# Patient Record
Sex: FEMALE | Employment: OTHER | URBAN - NONMETROPOLITAN AREA
[De-identification: names, ages, dates, MRNs, and addresses within clinical notes are randomized per-mention and may not be internally consistent; named-entity substitution may affect disease eponyms.]

---

## 2024-03-15 PROBLEM — E78.00 HYPERCHOLESTEREMIA: Status: ACTIVE | Noted: 2023-01-12

## 2024-03-15 PROBLEM — R53.1 WEAKNESS: Status: ACTIVE | Noted: 2020-11-24

## 2024-03-15 PROBLEM — I65.22 OCCLUSION OF LEFT CAROTID ARTERY: Status: ACTIVE | Noted: 2023-01-12

## 2024-03-15 PROBLEM — I44.7 LEFT BUNDLE BRANCH BLOCK: Status: ACTIVE | Noted: 2023-01-12

## 2024-03-15 RX ORDER — METOPROLOL TARTRATE 50 MG/1
50 TABLET, FILM COATED ORAL 2 TIMES DAILY
COMMUNITY
Start: 2020-09-28

## 2024-03-15 RX ORDER — MELOXICAM 15 MG/1
15 TABLET ORAL DAILY
COMMUNITY
Start: 2024-03-14

## 2024-03-15 RX ORDER — ASPIRIN 81 MG/1
81 TABLET ORAL DAILY
COMMUNITY
Start: 2020-11-25

## 2024-03-15 RX ORDER — ROSUVASTATIN CALCIUM 40 MG/1
40 TABLET, COATED ORAL DAILY
COMMUNITY
Start: 2024-01-02

## 2024-03-15 RX ORDER — AMLODIPINE BESYLATE 5 MG/1
5 TABLET ORAL 2 TIMES DAILY
COMMUNITY
Start: 2020-09-28

## 2024-04-24 ENCOUNTER — OFFICE VISIT (OUTPATIENT)
Dept: NEPHROLOGY | Age: 79
End: 2024-04-24
Payer: MEDICARE

## 2024-04-24 VITALS
DIASTOLIC BLOOD PRESSURE: 62 MMHG | BODY MASS INDEX: 23.39 KG/M2 | HEART RATE: 63 BPM | OXYGEN SATURATION: 92 % | WEIGHT: 149 LBS | SYSTOLIC BLOOD PRESSURE: 117 MMHG | HEIGHT: 67 IN

## 2024-04-24 DIAGNOSIS — I12.9 HYPERTENSIVE RENAL DISEASE, STAGE 1 THROUGH STAGE 4 OR UNSPECIFIED CHRONIC KIDNEY DISEASE: ICD-10-CM

## 2024-04-24 DIAGNOSIS — N18.32 CHRONIC KIDNEY DISEASE, STAGE 3B (HCC): Primary | ICD-10-CM

## 2024-04-24 PROCEDURE — 3074F SYST BP LT 130 MM HG: CPT | Performed by: INTERNAL MEDICINE

## 2024-04-24 PROCEDURE — G8427 DOCREV CUR MEDS BY ELIG CLIN: HCPCS | Performed by: INTERNAL MEDICINE

## 2024-04-24 PROCEDURE — G8400 PT W/DXA NO RESULTS DOC: HCPCS | Performed by: INTERNAL MEDICINE

## 2024-04-24 PROCEDURE — 1090F PRES/ABSN URINE INCON ASSESS: CPT | Performed by: INTERNAL MEDICINE

## 2024-04-24 PROCEDURE — 1036F TOBACCO NON-USER: CPT | Performed by: INTERNAL MEDICINE

## 2024-04-24 PROCEDURE — 99204 OFFICE O/P NEW MOD 45 MIN: CPT | Performed by: INTERNAL MEDICINE

## 2024-04-24 PROCEDURE — G8420 CALC BMI NORM PARAMETERS: HCPCS | Performed by: INTERNAL MEDICINE

## 2024-04-24 PROCEDURE — 1123F ACP DISCUSS/DSCN MKR DOCD: CPT | Performed by: INTERNAL MEDICINE

## 2024-04-24 PROCEDURE — 3078F DIAST BP <80 MM HG: CPT | Performed by: INTERNAL MEDICINE

## 2024-04-24 NOTE — PROGRESS NOTES
[facility] [place] [facility]  [facility]  [address]  [facility] [address]  Dept: [phone]  Loc: [phone]  Outpatient Consult Note  4/24/2024 11:14 AM        Pt Name:    Ashwini Pulido  YOB: 1945  Primary Care Physician:  Carline Bowen MD     Chief Complaint:   Chief Complaint   Patient presents with    New Patient     Referral from Anita Berrios for CKD 4, last labs 3/14/2024        Background Information/HPI   The patient is a 78 y.o. with history of CKD 3B, hypertension who is here for initial evaluation of elevated creatinine.  Past medical history also includes dyslipidemia and chronic back pain.  Has CKD. Old labs reviewed. Creat in the past been around 1.7 to 1.8. No known heart problems. No Hx PPM or AICD. Ex-smoking 1ppd x 40+ years, quit about 10 years ago. Denies any hx kidney stones. No hx leg swelling. No urinary complaints such as dysuria or hematuria. 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 Denies any NSAId intake recently. She says BP is usually in 120-130. She reports she has had HTN for several years. She says she only drinks about 16 ounces of liquids per day.       Allergies:  Patient has no known allergies.        Past Medical History:  Past Medical History:   Diagnosis Date    Chronic kidney disease     Hypertension     Kyphosis         Past Surgical History:  Past Surgical History:   Procedure Laterality Date    CHOLECYSTECTOMY N/A 11/2022        Family History:  Family History   Problem Relation Age of Onset    Heart Disease Mother     Colon Cancer Father     No Known Problems Sister     No Known Problems Sister     No Known Problems Sister     No Known Problems Sister     No Known Problems Sister     No Known Problems Brother         Social History:  Social History     Socioeconomic History    Marital status: Unknown     Spouse name: Not on file    Number of children: Not on file    Years of education: Not on file

## 2024-06-17 DIAGNOSIS — I12.9 HYPERTENSIVE RENAL DISEASE, STAGE 1 THROUGH STAGE 4 OR UNSPECIFIED CHRONIC KIDNEY DISEASE: ICD-10-CM

## 2024-06-17 DIAGNOSIS — N18.32 CHRONIC KIDNEY DISEASE, STAGE 3B (HCC): ICD-10-CM

## 2024-07-24 ENCOUNTER — OFFICE VISIT (OUTPATIENT)
Dept: NEPHROLOGY | Age: 79
End: 2024-07-24
Payer: MEDICARE

## 2024-07-24 VITALS
OXYGEN SATURATION: 91 % | WEIGHT: 141 LBS | DIASTOLIC BLOOD PRESSURE: 63 MMHG | SYSTOLIC BLOOD PRESSURE: 138 MMHG | BODY MASS INDEX: 22.13 KG/M2 | HEIGHT: 67 IN | HEART RATE: 59 BPM

## 2024-07-24 DIAGNOSIS — I12.9 HYPERTENSIVE RENAL DISEASE, STAGE 1 THROUGH STAGE 4 OR UNSPECIFIED CHRONIC KIDNEY DISEASE: ICD-10-CM

## 2024-07-24 DIAGNOSIS — R31.29 MICROSCOPIC HEMATURIA: ICD-10-CM

## 2024-07-24 DIAGNOSIS — R80.9 PROTEINURIA, UNSPECIFIED TYPE: ICD-10-CM

## 2024-07-24 DIAGNOSIS — N18.4 CKD (CHRONIC KIDNEY DISEASE), STAGE IV (HCC): Primary | ICD-10-CM

## 2024-07-24 DIAGNOSIS — N28.1 RENAL CYST: ICD-10-CM

## 2024-07-24 PROCEDURE — 1123F ACP DISCUSS/DSCN MKR DOCD: CPT | Performed by: INTERNAL MEDICINE

## 2024-07-24 PROCEDURE — 1090F PRES/ABSN URINE INCON ASSESS: CPT | Performed by: INTERNAL MEDICINE

## 2024-07-24 PROCEDURE — 99214 OFFICE O/P EST MOD 30 MIN: CPT | Performed by: INTERNAL MEDICINE

## 2024-07-24 PROCEDURE — 1036F TOBACCO NON-USER: CPT | Performed by: INTERNAL MEDICINE

## 2024-07-24 PROCEDURE — 3075F SYST BP GE 130 - 139MM HG: CPT | Performed by: INTERNAL MEDICINE

## 2024-07-24 PROCEDURE — G8420 CALC BMI NORM PARAMETERS: HCPCS | Performed by: INTERNAL MEDICINE

## 2024-07-24 PROCEDURE — 3078F DIAST BP <80 MM HG: CPT | Performed by: INTERNAL MEDICINE

## 2024-07-24 PROCEDURE — G8400 PT W/DXA NO RESULTS DOC: HCPCS | Performed by: INTERNAL MEDICINE

## 2024-07-24 PROCEDURE — G8427 DOCREV CUR MEDS BY ELIG CLIN: HCPCS | Performed by: INTERNAL MEDICINE

## 2024-07-24 RX ORDER — LOSARTAN POTASSIUM 25 MG/1
25 TABLET ORAL DAILY
Qty: 90 TABLET | Refills: 1 | Status: SHIPPED | OUTPATIENT
Start: 2024-07-24

## 2024-07-24 NOTE — PROGRESS NOTES
[facility] [place] [facility]  [facility]  [address]  [facility] [address]  Dept: [phone]  Loc: [phone]  Office Progress Note  7/24/2024 10:08 AM      Pt Name:    Ashwini Pulido  YOB: 1945  Primary Care Physician:  Carline Bowen MD     Chief Complaint:   Chief Complaint   Patient presents with    Chronic Kidney Disease     Stage 4        Background Information/Interval History:   The patient is a 78 y.o. with history of CKD 3B, hypertension who is here for follow-up evaluation of elevated creatinine.  Past medical history also includes dyslipidemia and chronic back pain.  Has CKD. Old labs reviewed. Creat in the past been around 1.7 to 1.8.  Ex-smoking 1ppd x 40+ years, quit about 10 years ago. Does not typically drink much water. Here for follow-up. No new c/o. No leg swelling. BP is stable. No UTI symptoms.  Has taken mobic in the past.      Past History:  Past Medical History:   Diagnosis Date    Chronic kidney disease     Hypertension     Kyphosis      Past Surgical History:   Procedure Laterality Date    CHOLECYSTECTOMY N/A 11/2022        VITALS:  /63 (Site: Right Upper Arm, Position: Sitting, Cuff Size: Medium Adult)   Pulse 59   Ht 1.702 m (5' 7\")   Wt 64 kg (141 lb)   SpO2 91%   BMI 22.08 kg/m²   Wt Readings from Last 3 Encounters:   07/24/24 64 kg (141 lb)   04/24/24 67.6 kg (149 lb)     Body mass index is 22.08 kg/m².     General Appearance: alert and cooperative with exam, appears comfortable, no distress  Oral: moist oral mucus membranes  Neck: No jugular venous distention  Lungs: Air entry B/L, no crackles or rales, no use of accessory muscles  Heart: S1, S2 heard  GI: soft, non-tender, no guarding  Extremities: No sig LE edema     Medications:  Current Outpatient Medications   Medication Sig Dispense Refill    amLODIPine (NORVASC) 5 MG tablet Take 1 tablet by mouth 2 times daily      aspirin 81 MG EC tablet

## 2024-08-01 LAB
BASOPHILS ABSOLUTE: 0 /ΜL
BASOPHILS RELATIVE PERCENT: 0.4 %
BUN BLDV-MCNC: 25 MG/DL
CALCIUM SERPL-MCNC: 9.8 MG/DL
CHLORIDE BLD-SCNC: 109 MMOL/L
CO2: 24 MMOL/L
CREAT SERPL-MCNC: 2.1 MG/DL
EGFR: 24
EOSINOPHILS ABSOLUTE: 0.3 /ΜL
EOSINOPHILS RELATIVE PERCENT: 5.5 %
GLUCOSE BLD-MCNC: 96 MG/DL
HCT VFR BLD CALC: 40.7 % (ref 36–46)
HEMOGLOBIN: 12.1 G/DL (ref 12–16)
LYMPHOCYTES ABSOLUTE: 1.2 /ΜL
LYMPHOCYTES RELATIVE PERCENT: 24.8 %
MCH RBC QN AUTO: 26.9 PG
MCHC RBC AUTO-ENTMCNC: 29.7 G/DL
MCV RBC AUTO: 90.4 FL
MONOCYTES ABSOLUTE: 0.4 /ΜL
MONOCYTES RELATIVE PERCENT: 7.6 %
NEUTROPHILS ABSOLUTE: 2.9 /ΜL
NEUTROPHILS RELATIVE PERCENT: 61.7 %
PLATELET # BLD: 174 K/ΜL
PMV BLD AUTO: 9.9 FL
POTASSIUM SERPL-SCNC: 5.1 MMOL/L
RBC # BLD: 4.5 10^6/ΜL
SODIUM BLD-SCNC: 141 MMOL/L
WBC # BLD: 4.8 10^3/ML

## 2024-08-21 ENCOUNTER — TELEPHONE (OUTPATIENT)
Dept: NEPHROLOGY | Age: 79
End: 2024-08-21

## 2024-08-21 DIAGNOSIS — N18.4 CKD (CHRONIC KIDNEY DISEASE), STAGE IV (HCC): Primary | ICD-10-CM

## 2024-08-21 DIAGNOSIS — I12.9 HYPERTENSIVE RENAL DISEASE, STAGE 1 THROUGH STAGE 4 OR UNSPECIFIED CHRONIC KIDNEY DISEASE: ICD-10-CM

## 2024-08-21 NOTE — TELEPHONE ENCOUNTER
Spoke to pt, she understands to get labs done. She stated that she will get labs done at [facility].    Lab orders pending.

## 2024-08-21 NOTE — TELEPHONE ENCOUNTER
----- Message from Dr. Connor Matta MD sent at 8/20/2024  6:09 PM EDT -----  Please have patient do immunofixation electrophoresis and serum free light chain assay.  ----- Message -----  From: Carine De Dios CMA ([facility])  Sent: 8/20/2024   1:42 PM EDT  To: Connor Matta MD

## 2024-08-28 ENCOUNTER — OFFICE VISIT (OUTPATIENT)
Dept: NEPHROLOGY | Age: 79
End: 2024-08-28
Payer: MEDICARE

## 2024-08-28 VITALS
WEIGHT: 140 LBS | DIASTOLIC BLOOD PRESSURE: 57 MMHG | HEART RATE: 54 BPM | BODY MASS INDEX: 21.97 KG/M2 | HEIGHT: 67 IN | SYSTOLIC BLOOD PRESSURE: 131 MMHG | OXYGEN SATURATION: 93 %

## 2024-08-28 DIAGNOSIS — I12.9 HYPERTENSIVE RENAL DISEASE, STAGE 1 THROUGH STAGE 4 OR UNSPECIFIED CHRONIC KIDNEY DISEASE: ICD-10-CM

## 2024-08-28 DIAGNOSIS — N18.4 CKD (CHRONIC KIDNEY DISEASE), STAGE IV (HCC): Primary | ICD-10-CM

## 2024-08-28 DIAGNOSIS — D47.2 MONOCLONAL GAMMOPATHY: ICD-10-CM

## 2024-08-28 PROCEDURE — G8420 CALC BMI NORM PARAMETERS: HCPCS | Performed by: INTERNAL MEDICINE

## 2024-08-28 PROCEDURE — 3078F DIAST BP <80 MM HG: CPT | Performed by: INTERNAL MEDICINE

## 2024-08-28 PROCEDURE — 99214 OFFICE O/P EST MOD 30 MIN: CPT | Performed by: INTERNAL MEDICINE

## 2024-08-28 PROCEDURE — 1036F TOBACCO NON-USER: CPT | Performed by: INTERNAL MEDICINE

## 2024-08-28 PROCEDURE — 1123F ACP DISCUSS/DSCN MKR DOCD: CPT | Performed by: INTERNAL MEDICINE

## 2024-08-28 PROCEDURE — G8427 DOCREV CUR MEDS BY ELIG CLIN: HCPCS | Performed by: INTERNAL MEDICINE

## 2024-08-28 PROCEDURE — G8400 PT W/DXA NO RESULTS DOC: HCPCS | Performed by: INTERNAL MEDICINE

## 2024-08-28 PROCEDURE — 1090F PRES/ABSN URINE INCON ASSESS: CPT | Performed by: INTERNAL MEDICINE

## 2024-08-28 PROCEDURE — 3075F SYST BP GE 130 - 139MM HG: CPT | Performed by: INTERNAL MEDICINE

## 2024-08-28 NOTE — PROGRESS NOTES
[facility] [place] [facility]  [facility] [place] [facility]  [address]  [facility] [address]  Dept: [phone]  Loc: [phone]  Office Progress Note  8/28/2024 10:28 AM      Pt Name:    Ashwini Pulido  YOB: 1945  Primary Care Physician:  Carline Bowen MD     Chief Complaint:   Chief Complaint   Patient presents with    Chronic Kidney Disease     Stage 4        Background Information/Interval History:   The patient is a 78 y.o. with history of CKD 3B, hypertension who is here for follow-up evaluation of elevated creatinine.  Past medical history also includes dyslipidemia and chronic back pain.  Has CKD. Old labs reviewed. Creat in the past been around 1.7 to 1.8.  Ex-smoking 1ppd x 40+ years, quit about 10 years ago. Has taken mobic in the past. Doing okay. Today offer no new complaints. Bp is stable.      Past History:  Past Medical History:   Diagnosis Date    Chronic kidney disease     Hypertension     Kyphosis      Past Surgical History:   Procedure Laterality Date    CHOLECYSTECTOMY N/A 11/2022        VITALS:  BP (!) 131/57 (Site: Right Upper Arm, Position: Sitting, Cuff Size: Medium Adult)   Pulse 54   Ht 1.702 m (5' 7\")   Wt 63.5 kg (140 lb)   SpO2 93%   BMI 21.93 kg/m²   Wt Readings from Last 3 Encounters:   08/28/24 63.5 kg (140 lb)   07/24/24 64 kg (141 lb)   04/24/24 67.6 kg (149 lb)     Body mass index is 21.93 kg/m².     General Appearance: alert and cooperative with exam, appears comfortable, no distress  Oral: moist oral mucus membranes  Neck: No jugular venous distention  Lungs: Air entry B/L, no crackles or rales, no use of accessory muscles  Heart: S1, S2 heard  GI: soft, non-tender, no guarding  Extremities: No sig LE edema     Medications:  Current Outpatient Medications   Medication Sig Dispense Refill    losartan (COZAAR) 25 MG tablet Take 1 tablet by mouth daily 90 tablet 1    amLODIPine (NORVASC) 5 MG tablet Take 1 tablet by

## 2024-09-03 ENCOUNTER — OFFICE VISIT (OUTPATIENT)
Dept: ONCOLOGY | Age: 79
End: 2024-09-03
Payer: MEDICARE

## 2024-09-03 ENCOUNTER — HOSPITAL ENCOUNTER (OUTPATIENT)
Dept: INFUSION THERAPY | Age: 79
Discharge: HOME | End: 2024-09-03
Payer: MEDICARE

## 2024-09-03 VITALS
OXYGEN SATURATION: 93 % | DIASTOLIC BLOOD PRESSURE: 63 MMHG | RESPIRATION RATE: 16 BRPM | TEMPERATURE: 98 F | HEART RATE: 57 BPM | SYSTOLIC BLOOD PRESSURE: 127 MMHG

## 2024-09-03 VITALS
TEMPERATURE: 98 F | RESPIRATION RATE: 16 BRPM | HEIGHT: 65 IN | OXYGEN SATURATION: 93 % | BODY MASS INDEX: 23.43 KG/M2 | DIASTOLIC BLOOD PRESSURE: 63 MMHG | HEART RATE: 57 BPM | SYSTOLIC BLOOD PRESSURE: 127 MMHG | WEIGHT: 140.6 LBS

## 2024-09-03 DIAGNOSIS — I12.9 HYPERTENSIVE RENAL DISEASE, STAGE 1 THROUGH STAGE 4 OR UNSPECIFIED CHRONIC KIDNEY DISEASE: ICD-10-CM

## 2024-09-03 DIAGNOSIS — D47.2 MONOCLONAL GAMMOPATHY: Primary | ICD-10-CM

## 2024-09-03 DIAGNOSIS — D47.2 MONOCLONAL GAMMOPATHY: ICD-10-CM

## 2024-09-03 DIAGNOSIS — N18.4 CKD (CHRONIC KIDNEY DISEASE), STAGE IV (HCC): ICD-10-CM

## 2024-09-03 LAB
ANION GAP SERPL CALC-SCNC: 15 MEQ/L (ref 8–16)
BUN SERPL-MCNC: 32 MG/DL (ref 7–22)
CALCIUM SERPL-MCNC: 9.8 MG/DL (ref 8.5–10.5)
CHLORIDE SERPL-SCNC: 107 MEQ/L (ref 98–111)
CO2 SERPL-SCNC: 20 MEQ/L (ref 23–33)
CREAT SERPL-MCNC: 1.9 MG/DL (ref 0.4–1.2)
GFR SERPL CREATININE-BSD FRML MDRD: 27 ML/MIN/1.73M2
GLUCOSE SERPL-MCNC: 102 MG/DL (ref 70–108)
POTASSIUM SERPL-SCNC: 5.1 MEQ/L (ref 3.5–5.2)
SODIUM SERPL-SCNC: 142 MEQ/L (ref 135–145)

## 2024-09-03 PROCEDURE — 80048 BASIC METABOLIC PNL TOTAL CA: CPT

## 2024-09-03 PROCEDURE — 3074F SYST BP LT 130 MM HG: CPT | Performed by: INTERNAL MEDICINE

## 2024-09-03 PROCEDURE — 3078F DIAST BP <80 MM HG: CPT | Performed by: INTERNAL MEDICINE

## 2024-09-03 PROCEDURE — 99211 OFF/OP EST MAY X REQ PHY/QHP: CPT

## 2024-09-03 PROCEDURE — 82784 ASSAY IGA/IGD/IGG/IGM EACH: CPT

## 2024-09-03 PROCEDURE — 99204 OFFICE O/P NEW MOD 45 MIN: CPT | Performed by: INTERNAL MEDICINE

## 2024-09-03 PROCEDURE — G8420 CALC BMI NORM PARAMETERS: HCPCS | Performed by: INTERNAL MEDICINE

## 2024-09-03 PROCEDURE — 1090F PRES/ABSN URINE INCON ASSESS: CPT | Performed by: INTERNAL MEDICINE

## 2024-09-03 PROCEDURE — 82232 ASSAY OF BETA-2 PROTEIN: CPT

## 2024-09-03 PROCEDURE — 1123F ACP DISCUSS/DSCN MKR DOCD: CPT | Performed by: INTERNAL MEDICINE

## 2024-09-03 PROCEDURE — G8400 PT W/DXA NO RESULTS DOC: HCPCS | Performed by: INTERNAL MEDICINE

## 2024-09-03 PROCEDURE — G8427 DOCREV CUR MEDS BY ELIG CLIN: HCPCS | Performed by: INTERNAL MEDICINE

## 2024-09-03 PROCEDURE — 1036F TOBACCO NON-USER: CPT | Performed by: INTERNAL MEDICINE

## 2024-09-03 ASSESSMENT — ENCOUNTER SYMPTOMS
BLOOD IN STOOL: 0
ANAL BLEEDING: 0
CHOKING: 0
BACK PAIN: 0
CONSTIPATION: 0
FACIAL SWELLING: 0
SINUS PAIN: 0
EYE DISCHARGE: 0
TROUBLE SWALLOWING: 0
ABDOMINAL PAIN: 0
SHORTNESS OF BREATH: 0
VOMITING: 0
APNEA: 0
ABDOMINAL DISTENTION: 0
COLOR CHANGE: 0
NAUSEA: 0
COUGH: 0
DIARRHEA: 1
RECTAL PAIN: 0
STRIDOR: 0
CHEST TIGHTNESS: 0
EYE PAIN: 0
WHEEZING: 0

## 2024-09-03 NOTE — PATIENT INSTRUCTIONS
Orders Placed This Encounter    IgA     Standing Status:   Future     Standing Expiration Date:   9/3/2025    IgG     Standing Status:   Future     Standing Expiration Date:   9/3/2025    IgM     Standing Status:   Future     Standing Expiration Date:   9/3/2025   Complete all active future lab orders today as well.    Return in about 2 weeks (around 9/17/2024).

## 2024-09-03 NOTE — PROGRESS NOTES
Hypertension    Hypercholesteremia        Surgery:  Past Surgical History:   Procedure Laterality Date    CHOLECYSTECTOMY N/A 11/2022        Medications:  Current Outpatient Medications   Medication Sig Dispense Refill    losartan (COZAAR) 25 MG tablet Take 1 tablet by mouth daily 90 tablet 1    amLODIPine (NORVASC) 5 MG tablet Take 1 tablet by mouth 2 times daily      aspirin 81 MG EC tablet Take 1 tablet by mouth daily      metoprolol tartrate (LOPRESSOR) 50 MG tablet Take 1 tablet by mouth 2 times daily      rosuvastatin (CRESTOR) 40 MG tablet Take 1 tablet by mouth daily       No current facility-administered medications for this visit.         EXAM:   height is 1.648 m (5' 4.9\") and weight is 63.8 kg (140 lb 9.6 oz). Her oral temperature is 98 °F (36.7 °C). Her blood pressure is 127/63 and her pulse is 57. Her respiration is 16 and oxygen saturation is 93%.   Body mass index is 23.47 kg/m².     Physical Exam  Physical Exam  Constitutional:       General: She is not in acute distress.     Appearance: Normal appearance. She is normal weight. She is not ill-appearing, toxic-appearing or diaphoretic.   HENT:      Head: Normocephalic.      Right Ear: External ear normal.      Left Ear: External ear normal.      Nose: Nose normal.   Eyes:      General: No scleral icterus.     Extraocular Movements: Extraocular movements intact.      Conjunctiva/sclera: Conjunctivae normal.   Neck:      Vascular: No carotid bruit.   Cardiovascular:      Rate and Rhythm: Normal rate and regular rhythm.      Heart sounds: No murmur heard.     No friction rub. No gallop.   Pulmonary:      Effort: No respiratory distress.      Breath sounds: Normal breath sounds. No stridor. No wheezing, rhonchi or rales.   Chest:      Chest wall: No tenderness.   Abdominal:      General: Abdomen is flat. Bowel sounds are normal. There is no distension.      Palpations: Abdomen is soft. There is no mass.      Tenderness: There is no abdominal tenderness.

## 2024-09-05 ENCOUNTER — HOSPITAL ENCOUNTER (OUTPATIENT)
Age: 79
Discharge: HOME | End: 2024-09-05
Payer: MEDICARE

## 2024-09-05 DIAGNOSIS — D47.2 MONOCLONAL GAMMOPATHY: ICD-10-CM

## 2024-09-05 DIAGNOSIS — N18.4 CKD (CHRONIC KIDNEY DISEASE), STAGE IV (HCC): ICD-10-CM

## 2024-09-05 DIAGNOSIS — I12.9 HYPERTENSIVE RENAL DISEASE, STAGE 1 THROUGH STAGE 4 OR UNSPECIFIED CHRONIC KIDNEY DISEASE: ICD-10-CM

## 2024-09-05 LAB
B2 MICROGLOB SERPL-MCNC: 5.2 MG/L
BACTERIA: ABNORMAL
BILIRUB UR QL STRIP: NEGATIVE
CASTS #/AREA URNS LPF: ABNORMAL /LPF
CASTS #/AREA URNS LPF: ABNORMAL /LPF
CHARACTER UR: CLEAR
CHARCOAL URNS QL MICRO: ABNORMAL
COLOR UR: YELLOW
CREAT UR-MCNC: 39.1 MG/DL
CRYSTALS URNS QL MICRO: ABNORMAL
EPITHELIAL CELLS, UA: ABNORMAL /HPF
GLUCOSE UR QL STRIP.AUTO: NEGATIVE MG/DL
HGB UR QL STRIP.AUTO: NEGATIVE
IGA SERPL-MCNC: 259 MG/DL (ref 70–400)
IGG SERPL-MCNC: 1350 MG/DL (ref 700–1600)
IGM SERPL-MCNC: 128 MG/DL (ref 40–230)
KETONES UR QL STRIP.AUTO: NEGATIVE
LEUKOCYTE ESTERASE UR QL STRIP.AUTO: ABNORMAL
NITRITE UR QL STRIP.AUTO: NEGATIVE
PH UR STRIP.AUTO: 5.5 [PH] (ref 5–9)
PROT UR STRIP.AUTO-MCNC: 30 MG/DL
PROT UR-MCNC: 14.3 MG/DL
PROT/CREAT 24H UR: 0.37 MG/G{CREAT}
RBC #/AREA URNS HPF: ABNORMAL /HPF
RENAL EPI CELLS #/AREA URNS HPF: ABNORMAL /[HPF]
SP GR UR REFRACT.AUTO: 1.01 (ref 1–1.03)
UROBILINOGEN UR QL STRIP.AUTO: 0.2 EU/DL (ref 0–1)
WBC #/AREA URNS HPF: ABNORMAL /HPF
YEAST LIKE FUNGI URNS QL MICRO: ABNORMAL

## 2024-09-05 PROCEDURE — 84156 ASSAY OF PROTEIN URINE: CPT

## 2024-09-05 PROCEDURE — 84166 PROTEIN E-PHORESIS/URINE/CSF: CPT

## 2024-09-05 PROCEDURE — 86335 IMMUNFIX E-PHORSIS/URINE/CSF: CPT

## 2024-09-05 PROCEDURE — 81001 URINALYSIS AUTO W/SCOPE: CPT

## 2024-09-05 PROCEDURE — 82570 ASSAY OF URINE CREATININE: CPT

## 2024-09-09 ENCOUNTER — TELEPHONE (OUTPATIENT)
Dept: NEPHROLOGY | Age: 79
End: 2024-09-09

## 2024-09-12 LAB — PROTEIN ELECTROPHORESIS, URINE: NORMAL

## 2024-09-17 ENCOUNTER — OFFICE VISIT (OUTPATIENT)
Dept: ONCOLOGY | Age: 79
End: 2024-09-17
Payer: MEDICARE

## 2024-09-17 ENCOUNTER — HOSPITAL ENCOUNTER (OUTPATIENT)
Dept: INFUSION THERAPY | Age: 79
Discharge: HOME | End: 2024-09-17
Payer: MEDICARE

## 2024-09-17 VITALS
TEMPERATURE: 98.9 F | SYSTOLIC BLOOD PRESSURE: 131 MMHG | RESPIRATION RATE: 16 BRPM | OXYGEN SATURATION: 91 % | BODY MASS INDEX: 23.76 KG/M2 | DIASTOLIC BLOOD PRESSURE: 63 MMHG | HEART RATE: 63 BPM | HEIGHT: 65 IN | WEIGHT: 142.6 LBS

## 2024-09-17 VITALS
HEART RATE: 63 BPM | TEMPERATURE: 98.9 F | OXYGEN SATURATION: 91 % | RESPIRATION RATE: 16 BRPM | DIASTOLIC BLOOD PRESSURE: 63 MMHG | SYSTOLIC BLOOD PRESSURE: 131 MMHG

## 2024-09-17 DIAGNOSIS — D47.2 MONOCLONAL GAMMOPATHY: Primary | ICD-10-CM

## 2024-09-17 DIAGNOSIS — D47.2 MONOCLONAL GAMMOPATHY: ICD-10-CM

## 2024-09-17 LAB
BASOPHILS # BLD AUTO: 0 THOU/MM3 (ref 0–0.1)
BASOPHILS NFR BLD AUTO: 0 % (ref 0–3)
EOSINOPHIL # BLD AUTO: 0.2 THOU/MM3 (ref 0–0.4)
EOSINOPHIL NFR BLD AUTO: 4 % (ref 0–4)
ERYTHROCYTE [DISTWIDTH] IN BLOOD BY AUTOMATED COUNT: 16.7 % (ref 11.5–14.5)
HCT VFR BLD AUTO: 37.6 % (ref 37–47)
HGB BLD-MCNC: 11.5 GM/DL (ref 12–16)
IMM GRANULOCYTES # BLD AUTO: 0.01 THOU/MM3 (ref 0–0.07)
IMM GRANULOCYTES NFR BLD AUTO: 0 %
LYMPHOCYTES # BLD AUTO: 1.5 THOU/MM3 (ref 1–4.8)
LYMPHOCYTES NFR BLD AUTO: 28 % (ref 15–47)
MCH RBC QN AUTO: 27.6 PG (ref 26–33)
MCHC RBC AUTO-ENTMCNC: 30.6 GM/DL (ref 32.2–35.5)
MCV RBC AUTO: 90 FL (ref 81–99)
MONOCYTES # BLD AUTO: 0.4 THOU/MM3 (ref 0.4–1.3)
MONOCYTES NFR BLD AUTO: 7 % (ref 0–12)
NEUTROPHILS ABSOLUTE: 3.2 THOU/MM3 (ref 1.8–7.7)
NEUTROPHILS NFR BLD AUTO: 61 % (ref 43–75)
PLATELET # BLD AUTO: 139 THOU/MM3 (ref 130–400)
PMV BLD AUTO: 9.3 FL (ref 9.4–12.4)
RBC # BLD AUTO: 4.16 MILL/MM3 (ref 4.2–5.4)
WBC # BLD AUTO: 5.3 THOU/MM3 (ref 4.8–10.8)

## 2024-09-17 PROCEDURE — 84155 ASSAY OF PROTEIN SERUM: CPT

## 2024-09-17 PROCEDURE — 84165 PROTEIN E-PHORESIS SERUM: CPT

## 2024-09-17 PROCEDURE — 1036F TOBACCO NON-USER: CPT | Performed by: STUDENT IN AN ORGANIZED HEALTH CARE EDUCATION/TRAINING PROGRAM

## 2024-09-17 PROCEDURE — 1123F ACP DISCUSS/DSCN MKR DOCD: CPT | Performed by: STUDENT IN AN ORGANIZED HEALTH CARE EDUCATION/TRAINING PROGRAM

## 2024-09-17 PROCEDURE — G8420 CALC BMI NORM PARAMETERS: HCPCS | Performed by: STUDENT IN AN ORGANIZED HEALTH CARE EDUCATION/TRAINING PROGRAM

## 2024-09-17 PROCEDURE — G8400 PT W/DXA NO RESULTS DOC: HCPCS | Performed by: STUDENT IN AN ORGANIZED HEALTH CARE EDUCATION/TRAINING PROGRAM

## 2024-09-17 PROCEDURE — 99211 OFF/OP EST MAY X REQ PHY/QHP: CPT

## 2024-09-17 PROCEDURE — 1090F PRES/ABSN URINE INCON ASSESS: CPT | Performed by: STUDENT IN AN ORGANIZED HEALTH CARE EDUCATION/TRAINING PROGRAM

## 2024-09-17 PROCEDURE — 99213 OFFICE O/P EST LOW 20 MIN: CPT | Performed by: STUDENT IN AN ORGANIZED HEALTH CARE EDUCATION/TRAINING PROGRAM

## 2024-09-17 PROCEDURE — 83521 IG LIGHT CHAINS FREE EACH: CPT

## 2024-09-17 PROCEDURE — G8427 DOCREV CUR MEDS BY ELIG CLIN: HCPCS | Performed by: STUDENT IN AN ORGANIZED HEALTH CARE EDUCATION/TRAINING PROGRAM

## 2024-09-17 PROCEDURE — 3078F DIAST BP <80 MM HG: CPT | Performed by: STUDENT IN AN ORGANIZED HEALTH CARE EDUCATION/TRAINING PROGRAM

## 2024-09-17 PROCEDURE — 3075F SYST BP GE 130 - 139MM HG: CPT | Performed by: STUDENT IN AN ORGANIZED HEALTH CARE EDUCATION/TRAINING PROGRAM

## 2024-09-17 PROCEDURE — 85025 COMPLETE CBC W/AUTO DIFF WBC: CPT

## 2024-09-17 ASSESSMENT — ENCOUNTER SYMPTOMS
BACK PAIN: 0
FACIAL SWELLING: 0
APNEA: 0
STRIDOR: 0
WHEEZING: 0
COLOR CHANGE: 0
ANAL BLEEDING: 0
ABDOMINAL DISTENTION: 0
CONSTIPATION: 0
EYE PAIN: 0
TROUBLE SWALLOWING: 0
EYE DISCHARGE: 0
DIARRHEA: 0
CHOKING: 0
CHEST TIGHTNESS: 0
SINUS PAIN: 0
BLOOD IN STOOL: 0
COUGH: 0
ABDOMINAL PAIN: 0
RECTAL PAIN: 0
VOMITING: 0
SHORTNESS OF BREATH: 0
NAUSEA: 0

## 2024-09-20 LAB — KAPPA/LAMBDA FREE LIGHT CHAINS: NORMAL

## 2024-09-22 LAB
ALBUMIN SERPL ELPH-MCNC: 3.53 G/DL (ref 3.75–5.01)
ALPHA1 GLOB SERPL ELPH-MCNC: 0.31 G/DL (ref 0.19–0.46)
ALPHA2 GLOB SERPL ELPH-MCNC: 0.81 G/DL (ref 0.48–1.05)
B-GLOBULIN SERPL ELPH-MCNC: 0.73 G/DL (ref 0.48–1.1)
GAMMA GLOB SERPL ELPH-MCNC: 1.12 G/DL (ref 0.62–1.51)
INTERPRETATION SERPL IFE-IMP: ABNORMAL
M PROTEIN SERPL ELPH-MCNC: ABNORMAL G/DL
MONOCLON BAND OBS SERPL: ABNORMAL
PATHOLOGY STUDY: ABNORMAL
PROT SERPL-MCNC: 6.5 G/DL (ref 6.3–8.2)

## 2024-11-05 ENCOUNTER — OFFICE VISIT (OUTPATIENT)
Dept: NEPHROLOGY | Age: 79
End: 2024-11-05

## 2024-11-05 VITALS
SYSTOLIC BLOOD PRESSURE: 120 MMHG | OXYGEN SATURATION: 94 % | HEIGHT: 65 IN | HEART RATE: 60 BPM | BODY MASS INDEX: 23.16 KG/M2 | DIASTOLIC BLOOD PRESSURE: 60 MMHG | WEIGHT: 139 LBS

## 2024-11-05 DIAGNOSIS — D47.2 MGUS (MONOCLONAL GAMMOPATHY OF UNKNOWN SIGNIFICANCE): ICD-10-CM

## 2024-11-05 DIAGNOSIS — N28.1 RENAL CYST: ICD-10-CM

## 2024-11-05 DIAGNOSIS — I12.9 HYPERTENSIVE RENAL DISEASE, STAGE 1 THROUGH STAGE 4 OR UNSPECIFIED CHRONIC KIDNEY DISEASE: ICD-10-CM

## 2024-11-05 DIAGNOSIS — N18.4 CKD (CHRONIC KIDNEY DISEASE), STAGE IV (HCC): Primary | ICD-10-CM

## 2024-11-05 NOTE — PROGRESS NOTES
[facility] [place] [facility]  [facility] [place] [facility]  [address]  [facility] [address]  Dept: [phone]  Loc: [phone]  Office Progress Note  11/5/2024 9:17 AM      Pt Name:    Ashwini Pulido  YOB: 1945  Primary Care Physician:  Carline Bowen MD     Chief Complaint:   Chief Complaint   Patient presents with    Chronic Kidney Disease     Stage 4        Background Information/Interval History:   The patient is a 78 y.o. with history of CKD IV, hypertension who is here for follow-up evaluation of elevated creatinine.  Past medical history also includes dyslipidemia and chronic back pain.  Has CKD. Old labs reviewed. Creat in the past been around 1.7 to 1.8.  Ex-smoking 1ppd x 40+ years, quit about 10 years ago. Has taken mobic in the past.     Pt is here for 2 months follow-up. No leg swelling. No urinary complaints. She is wearing her hearing aid but still very hard of hearing. Also appears to have poor insight. Had long discussion. She was not able to recall much from her recent visit to the hematologist. I reviewed hematologist noted and notified patient of hematologist's plan.      Past History:  Past Medical History:   Diagnosis Date    Chronic kidney disease     Hypertension     Kyphosis      Past Surgical History:   Procedure Laterality Date    CHOLECYSTECTOMY N/A 11/2022        VITALS:  /60 (Site: Right Upper Arm, Position: Sitting, Cuff Size: Medium Adult)   Pulse 60   Ht 1.651 m (5' 5\")   Wt 63 kg (139 lb)   SpO2 94%   BMI 23.13 kg/m²   Wt Readings from Last 3 Encounters:   11/05/24 63 kg (139 lb)   09/17/24 64.7 kg (142 lb 9.6 oz)   09/03/24 63.8 kg (140 lb 9.6 oz)     Body mass index is 23.13 kg/m².     General Appearance: alert and cooperative with exam, appears comfortable, no distress  Lungs: Air entry B/L, no crackles or rales, no use of accessory muscles  Heart: S1, S2 heard  GI: soft, non-tender, no guarding  Extremities:

## 2025-02-14 ENCOUNTER — OFFICE VISIT (OUTPATIENT)
Dept: NEPHROLOGY | Age: 80
End: 2025-02-14
Payer: MEDICARE

## 2025-02-14 VITALS
OXYGEN SATURATION: 95 % | HEIGHT: 66 IN | HEART RATE: 60 BPM | DIASTOLIC BLOOD PRESSURE: 62 MMHG | SYSTOLIC BLOOD PRESSURE: 127 MMHG | BODY MASS INDEX: 22.18 KG/M2 | WEIGHT: 138 LBS

## 2025-02-14 DIAGNOSIS — R82.90 ABNORMAL FINDING ON URINALYSIS: ICD-10-CM

## 2025-02-14 DIAGNOSIS — N18.4 CKD (CHRONIC KIDNEY DISEASE), STAGE IV (HCC): Primary | ICD-10-CM

## 2025-02-14 DIAGNOSIS — I12.9 HYPERTENSIVE RENAL DISEASE, STAGE 1 THROUGH STAGE 4 OR UNSPECIFIED CHRONIC KIDNEY DISEASE: ICD-10-CM

## 2025-02-14 DIAGNOSIS — R80.1 PERSISTENT PROTEINURIA, UNSPECIFIED: ICD-10-CM

## 2025-02-14 PROCEDURE — 3078F DIAST BP <80 MM HG: CPT | Performed by: INTERNAL MEDICINE

## 2025-02-14 PROCEDURE — 99214 OFFICE O/P EST MOD 30 MIN: CPT | Performed by: INTERNAL MEDICINE

## 2025-02-14 PROCEDURE — G8427 DOCREV CUR MEDS BY ELIG CLIN: HCPCS | Performed by: INTERNAL MEDICINE

## 2025-02-14 PROCEDURE — G8420 CALC BMI NORM PARAMETERS: HCPCS | Performed by: INTERNAL MEDICINE

## 2025-02-14 PROCEDURE — G8400 PT W/DXA NO RESULTS DOC: HCPCS | Performed by: INTERNAL MEDICINE

## 2025-02-14 PROCEDURE — 1123F ACP DISCUSS/DSCN MKR DOCD: CPT | Performed by: INTERNAL MEDICINE

## 2025-02-14 PROCEDURE — 1090F PRES/ABSN URINE INCON ASSESS: CPT | Performed by: INTERNAL MEDICINE

## 2025-02-14 PROCEDURE — 1159F MED LIST DOCD IN RCRD: CPT | Performed by: INTERNAL MEDICINE

## 2025-02-14 PROCEDURE — 3074F SYST BP LT 130 MM HG: CPT | Performed by: INTERNAL MEDICINE

## 2025-02-14 PROCEDURE — 1036F TOBACCO NON-USER: CPT | Performed by: INTERNAL MEDICINE

## 2025-02-14 NOTE — PROGRESS NOTES
Wants to know why her bottom number at home is low. BP at home 122/58  
2 times daily      aspirin 81 MG EC tablet Take 1 tablet by mouth daily      metoprolol tartrate (LOPRESSOR) 50 MG tablet Take 1 tablet by mouth 2 times daily      rosuvastatin (CRESTOR) 40 MG tablet Take 1 tablet by mouth daily       No current facility-administered medications for this visit.        Laboratory & Diagnostics:  Old progress notes from referring physician reviewed.  Radiology/US kidneys: 10 and 10.5 cm kidneys, B/L renal cysts  Echo:   Old labs reviewed:  November 2020 creatinine 1.8  August 2021 creatinine 1.7  October 2022 creatinine 1.6    June 2024: , K 4.7, creat 2.4, UPCR 1.4 g/g, Urine RBC 3-5 RBC, 1+ protein, 1+ blood  Aug 2024: K 5.1, creat 2.5, Hb 12.1, UA: 1+ blood, 1+ protein, 0-2 RBC, UPCR 1 g/g  Free light chain ratio 1.2, IgG lambda monoclonal band  Hep B (-), ANCA (-), Hep C (-), Complements (-), AGUSTIN (-)  Anti GBM (-), anti DS DNA (-)    Sept 2024:: K 5.1, Creat 1.9, Hb 11.5  UA: 30 protein, 0-2 RBC, UPCR 370 mg/g    Feb 2025: K 5.1, Creat 3.0, BUN 42, UA: 2+ protein, 2+ blood, +nitrite. UPCR 1.14 g/g       Impression/Plan:   1. CKD IV: has underlying risk factors of HTN and possible some element of senile nephrosclerosis. But I am concerned about additional process given she has some proteinuria, hematuria. She has IgG lambda monoclonal gammopathy.  She is seeing hematologist for MGUS. She has worsening proteinuria. Her creatinine is rising. I've strongly advised her to purse a kidney biopsy. Increase water intake. Indications, R/B/A were all discussed in detail. Discussed renal prognosis. She was reluctant to pursue kidney biopsy but after explaining her multiple times that creat is worsening, proteinuria is worsening- she has agreed to proceed. I've explained to her in detail what biopsy means, Risks, Benefits,Alternatives (R/B/A) as stated above were clearly discussed. No NSAIDs or aspirin for atleast 5 days before the biopsy.     2. HTN: on oral medications. On

## 2025-03-07 ENCOUNTER — HOSPITAL ENCOUNTER (OUTPATIENT)
Dept: CT IMAGING | Age: 80
Discharge: HOME | End: 2025-03-07
Payer: MEDICARE

## 2025-03-07 VITALS
TEMPERATURE: 98.8 F | RESPIRATION RATE: 12 BRPM | BODY MASS INDEX: 21.47 KG/M2 | HEART RATE: 72 BPM | DIASTOLIC BLOOD PRESSURE: 61 MMHG | SYSTOLIC BLOOD PRESSURE: 137 MMHG | OXYGEN SATURATION: 91 % | WEIGHT: 133 LBS

## 2025-03-07 DIAGNOSIS — R80.1 PERSISTENT PROTEINURIA, UNSPECIFIED: ICD-10-CM

## 2025-03-07 DIAGNOSIS — N18.4 CKD (CHRONIC KIDNEY DISEASE), STAGE IV (HCC): ICD-10-CM

## 2025-03-07 DIAGNOSIS — R82.90 ABNORMAL FINDING ON URINALYSIS: ICD-10-CM

## 2025-03-07 DIAGNOSIS — I12.9 HYPERTENSIVE RENAL DISEASE, STAGE 1 THROUGH STAGE 4 OR UNSPECIFIED CHRONIC KIDNEY DISEASE: ICD-10-CM

## 2025-03-07 LAB
CREAT SERPL-MCNC: 2.5 MG/DL (ref 0.5–0.9)
DEPRECATED RDW RBC AUTO: 50.1 FL (ref 35–45)
ERYTHROCYTE [DISTWIDTH] IN BLOOD BY AUTOMATED COUNT: 14.6 % (ref 11.5–14.5)
GFR SERPL CREATININE-BSD FRML MDRD: 19 ML/MIN/1.73M2
HCT VFR BLD AUTO: 38.3 % (ref 37–47)
HGB BLD-MCNC: 11.9 GM/DL (ref 12–16)
MCH RBC QN AUTO: 29.2 PG (ref 26–33)
MCHC RBC AUTO-ENTMCNC: 31.1 GM/DL (ref 32.2–35.5)
MCV RBC AUTO: 94.1 FL (ref 81–99)
PLATELET # BLD AUTO: 146 THOU/MM3 (ref 130–400)
PMV BLD AUTO: 10 FL (ref 9.4–12.4)
RBC # BLD AUTO: 4.07 MILL/MM3 (ref 4.2–5.4)
WBC # BLD AUTO: 5.4 THOU/MM3 (ref 4.8–10.8)

## 2025-03-07 PROCEDURE — 6360000002 HC RX W HCPCS: Performed by: RADIOLOGY

## 2025-03-07 PROCEDURE — 6370000000 HC RX 637 (ALT 250 FOR IP): Performed by: RADIOLOGY

## 2025-03-07 PROCEDURE — 2580000003 HC RX 258: Performed by: RADIOLOGY

## 2025-03-07 PROCEDURE — 77012 CT SCAN FOR NEEDLE BIOPSY: CPT

## 2025-03-07 RX ORDER — SODIUM CHLORIDE 450 MG/100ML
INJECTION, SOLUTION INTRAVENOUS CONTINUOUS
Status: DISCONTINUED | OUTPATIENT
Start: 2025-03-07 | End: 2025-03-08 | Stop reason: HOSPADM

## 2025-03-07 RX ORDER — MIDAZOLAM HYDROCHLORIDE 1 MG/ML
1 INJECTION, SOLUTION INTRAMUSCULAR; INTRAVENOUS ONCE
Status: DISCONTINUED | OUTPATIENT
Start: 2025-03-07 | End: 2025-03-08 | Stop reason: HOSPADM

## 2025-03-07 RX ORDER — BACITRACIN ZINC 500 [USP'U]/G
OINTMENT TOPICAL PRN
Status: COMPLETED | OUTPATIENT
Start: 2025-03-07 | End: 2025-03-07

## 2025-03-07 RX ORDER — ONDANSETRON 2 MG/ML
INJECTION INTRAMUSCULAR; INTRAVENOUS PRN
Status: COMPLETED | OUTPATIENT
Start: 2025-03-07 | End: 2025-03-07

## 2025-03-07 RX ORDER — MIDAZOLAM HYDROCHLORIDE 1 MG/ML
INJECTION, SOLUTION INTRAMUSCULAR; INTRAVENOUS PRN
Status: COMPLETED | OUTPATIENT
Start: 2025-03-07 | End: 2025-03-07

## 2025-03-07 RX ADMIN — HYDROMORPHONE HYDROCHLORIDE 0.5 MG: 1 INJECTION, SOLUTION INTRAMUSCULAR; INTRAVENOUS; SUBCUTANEOUS at 09:10

## 2025-03-07 RX ADMIN — MIDAZOLAM 0.5 MG: 1 INJECTION INTRAMUSCULAR; INTRAVENOUS at 09:10

## 2025-03-07 RX ADMIN — SODIUM CHLORIDE: 0.45 INJECTION, SOLUTION INTRAVENOUS at 07:31

## 2025-03-07 RX ADMIN — MIDAZOLAM 0.5 MG: 1 INJECTION INTRAMUSCULAR; INTRAVENOUS at 08:57

## 2025-03-07 RX ADMIN — BACITRACIN ZINC 1 UNITS: 500 OINTMENT TOPICAL at 09:36

## 2025-03-07 RX ADMIN — ONDANSETRON 4 MG: 2 INJECTION INTRAMUSCULAR; INTRAVENOUS at 10:11

## 2025-03-07 RX ADMIN — HYDROMORPHONE HYDROCHLORIDE 0.5 MG: 1 INJECTION, SOLUTION INTRAMUSCULAR; INTRAVENOUS; SUBCUTANEOUS at 08:57

## 2025-03-07 NOTE — PROGRESS NOTES
0825 Pt arrived to CT holding. Skin natural for race, warm, dry. Respirations even and unlabored.   0830 Dr. Carrizales in to evaluate pt and explain procedure.  0835 Consent obtained. Pt verbalizes agreement of site of procedure and procedure to be performed.  to imaging waiting room.   0847 Pt positioned  prone  on CT table. Monitor applied.  0850 Pre scans complete.   0900 Procedure started with Dr. Carrizales  0922 Core samples x 4 obtained from left kidney .  0924 Core samples sent to lab.   0935 Received call from lab, specimens adequate.  0936 Procedure complete. Surgifoam injected into biopsy tract per Dr. Carrizales. Bandaid to left flank puncture site.   0945 Pt repositioned supine on CT table.   1006  Post scans complete.   1009 Pt back to cart. Positioned for comfort.   1014 Report called to [facility].   1014 Pt transported to [facility] via cart. Skin natural for race, warm, dry. Respirations even and unlabored. Denies c/o pain at this time, c/o slight nausea.  with pt.

## 2025-03-07 NOTE — PROGRESS NOTES
Formulation and discussion of sedation / procedure plans, risks, benefits, side effects and alternatives with patient and/or responsible adult completed.    History and Physical reviewed and unchanged.    Electronically signed by Neeraj Carrizales MD on 3/7/25 at 9:31 AM EST

## 2025-03-07 NOTE — H&P
available.   Comment:    [x]Previous sedation/anesthesia experiences assessed.   Comment:    [x]The patient is an appropriate candidate to undergo the planned procedure sedation and anesthesia. (Refer to nursing sedation/analgesia documentation record)  [x]Formulation and discussion of sedation/procedure plan, risks, and expectations with patient and/or responsible adult completed.  [x]Patient examined immediately prior to the procedure. (Refer to nursing sedation/analgesia documentation record)    Neeraj Carrizales MD, MD  Electronically signed 3/7/2025 at 9:31 AM

## 2025-03-07 NOTE — PROGRESS NOTES
0700 Patient ambulatory to [facility] for Kidney biopsy. Patient confirms NPO, held Aspirin for 5 days.  at bedside. PT RIGHTS AND RESPONSIBILITIES OFFERED TO PT.    1020 Patient back to room post procedure. Band aid to left back dry and intact. Area soft. Small golf ball size area protruding. Applied ice pack to area. Patient denies any pain. Just some nausea.     1027 Patient resting in bed. Denies any pain. Band aid to left flank dry and intact. Area remains to same soft.     1030 Patient resting in bed. Band aid to left flank no changes.     1038 Patient still feeling nauseated. Band aid to left back no changed.     1100 Patient resting in bed. Denies any pain. Feels dizzy.     1115 Patient reports feeling little better. Band aid to left flank area dry and intact. No changes in biopsy site.     1145 Patient resting in bed. Band aid to left back dry and intact. No changes to biopsy site.     1205 Patient vomiting green liquid.     1215 Patient resting in bed. Reports feeling better after throwing.     1315 Patient resting in bed. States she feels fine. Band aid to lower back dry and intact.     1420 Patient assisted up to bathroom tolerated well. Urine clear yellow no signs of blood. Patient states she feels like she can go home. Band aid to left back dry and intact.     1425 AVS reviewed with patient and . Verbalizes understanding. Patient discharged in wheelchair to [facility].           _M___ Safety:       (Environmental)  [place] to environment  Ensure ID band is correct and in place/ allergy band as needed  Assess for fall risk  Initiate fall precautions as applicable (fall band, side rails, etc.)  Call light within reach  Bed in low position/ wheels locked    _M___ Pain:       Assess pain level and characteristics  Administer analgesics as ordered  Assess effectiveness of pain management and report to MD as needed    _M___ Knowledge Deficit:  Assess baseline knowledge  Provide teaching at level of

## 2025-03-10 ENCOUNTER — TELEPHONE (OUTPATIENT)
Dept: INTERVENTIONAL RADIOLOGY/VASCULAR | Age: 80
End: 2025-03-10

## 2025-03-12 LAB — MISC REFERENCE: NORMAL

## 2025-03-14 ENCOUNTER — OFFICE VISIT (OUTPATIENT)
Dept: NEPHROLOGY | Age: 80
End: 2025-03-14
Payer: MEDICARE

## 2025-03-14 ENCOUNTER — RESULTS FOLLOW-UP (OUTPATIENT)
Dept: NEPHROLOGY | Age: 80
End: 2025-03-14

## 2025-03-14 VITALS
OXYGEN SATURATION: 95 % | HEART RATE: 59 BPM | WEIGHT: 135 LBS | BODY MASS INDEX: 21.69 KG/M2 | SYSTOLIC BLOOD PRESSURE: 126 MMHG | HEIGHT: 66 IN | DIASTOLIC BLOOD PRESSURE: 61 MMHG

## 2025-03-14 DIAGNOSIS — N28.1 RENAL CYST: ICD-10-CM

## 2025-03-14 DIAGNOSIS — I12.9 HYPERTENSIVE RENAL DISEASE, STAGE 1 THROUGH STAGE 4 OR UNSPECIFIED CHRONIC KIDNEY DISEASE: ICD-10-CM

## 2025-03-14 DIAGNOSIS — N18.4 CKD (CHRONIC KIDNEY DISEASE), STAGE IV (HCC): Primary | ICD-10-CM

## 2025-03-14 PROCEDURE — 1036F TOBACCO NON-USER: CPT | Performed by: INTERNAL MEDICINE

## 2025-03-14 PROCEDURE — 3074F SYST BP LT 130 MM HG: CPT | Performed by: INTERNAL MEDICINE

## 2025-03-14 PROCEDURE — G8427 DOCREV CUR MEDS BY ELIG CLIN: HCPCS | Performed by: INTERNAL MEDICINE

## 2025-03-14 PROCEDURE — 3078F DIAST BP <80 MM HG: CPT | Performed by: INTERNAL MEDICINE

## 2025-03-14 PROCEDURE — 1090F PRES/ABSN URINE INCON ASSESS: CPT | Performed by: INTERNAL MEDICINE

## 2025-03-14 PROCEDURE — G8400 PT W/DXA NO RESULTS DOC: HCPCS | Performed by: INTERNAL MEDICINE

## 2025-03-14 PROCEDURE — G8420 CALC BMI NORM PARAMETERS: HCPCS | Performed by: INTERNAL MEDICINE

## 2025-03-14 PROCEDURE — 1159F MED LIST DOCD IN RCRD: CPT | Performed by: INTERNAL MEDICINE

## 2025-03-14 PROCEDURE — 99213 OFFICE O/P EST LOW 20 MIN: CPT | Performed by: INTERNAL MEDICINE

## 2025-03-14 PROCEDURE — 1123F ACP DISCUSS/DSCN MKR DOCD: CPT | Performed by: INTERNAL MEDICINE

## 2025-03-14 RX ORDER — CIPROFLOXACIN 250 MG/1
250 TABLET, FILM COATED ORAL 2 TIMES DAILY
Qty: 6 TABLET | Refills: 0 | Status: SHIPPED | OUTPATIENT
Start: 2025-03-14 | End: 2025-03-17

## 2025-03-14 NOTE — PROGRESS NOTES
Metabolic Panel    Basic Metabolic Panel    PTH, Intact    Phosphorus    Hemoglobin and Hematocrit     Return in about 4 months (around 7/14/2025).    Connor Matta MD  [facility]

## 2025-03-25 ENCOUNTER — TELEMEDICINE (OUTPATIENT)
Dept: ONCOLOGY | Age: 80
End: 2025-03-25
Payer: MEDICARE

## 2025-03-25 ENCOUNTER — HOSPITAL ENCOUNTER (OUTPATIENT)
Dept: INFUSION THERAPY | Age: 80
Discharge: HOME | End: 2025-03-25
Payer: MEDICARE

## 2025-03-25 VITALS
WEIGHT: 135.6 LBS | OXYGEN SATURATION: 96 % | SYSTOLIC BLOOD PRESSURE: 138 MMHG | HEART RATE: 61 BPM | BODY MASS INDEX: 21.79 KG/M2 | DIASTOLIC BLOOD PRESSURE: 65 MMHG | TEMPERATURE: 97.6 F | HEIGHT: 66 IN | RESPIRATION RATE: 16 BRPM

## 2025-03-25 VITALS
DIASTOLIC BLOOD PRESSURE: 65 MMHG | SYSTOLIC BLOOD PRESSURE: 138 MMHG | HEART RATE: 61 BPM | TEMPERATURE: 97.6 F | OXYGEN SATURATION: 96 % | RESPIRATION RATE: 16 BRPM

## 2025-03-25 DIAGNOSIS — D47.2 MONOCLONAL GAMMOPATHY: Primary | ICD-10-CM

## 2025-03-25 DIAGNOSIS — D47.2 MGUS (MONOCLONAL GAMMOPATHY OF UNKNOWN SIGNIFICANCE): ICD-10-CM

## 2025-03-25 DIAGNOSIS — D72.0 GENETIC ANOMALIES OF LEUKOCYTES (HCC): ICD-10-CM

## 2025-03-25 LAB
ALBUMIN SERPL BCG-MCNC: 3.7 G/DL (ref 3.4–4.9)
ALP SERPL-CCNC: 79 U/L (ref 35–104)
ALT SERPL W/O P-5'-P-CCNC: 17 U/L (ref 10–35)
AST SERPL-CCNC: 23 U/L (ref 10–35)
BASOPHILS # BLD AUTO: 0 THOU/MM3 (ref 0–0.1)
BASOPHILS NFR BLD AUTO: 0 % (ref 0–3)
BILIRUB CONJ SERPL-MCNC: < 0.1 MG/DL (ref 0–0.2)
BILIRUB SERPL-MCNC: 0.3 MG/DL (ref 0.3–1.2)
BUN BLDP-MCNC: 30 MG/DL (ref 8–26)
CHLORIDE BLD-SCNC: 111 MEQ/L (ref 98–109)
CREAT BLD-MCNC: 2.8 MG/DL (ref 0.5–1.2)
EOSINOPHIL # BLD AUTO: 0.2 THOU/MM3 (ref 0–0.4)
EOSINOPHIL NFR BLD AUTO: 4 % (ref 0–4)
ERYTHROCYTE [DISTWIDTH] IN BLOOD BY AUTOMATED COUNT: 14.2 % (ref 11.5–14.5)
GFR SERPL CREATININE-BSD FRML MDRD: 17 ML/MIN/1.73M2
GLUCOSE BLD-MCNC: 105 MG/DL (ref 70–108)
HCT VFR BLD AUTO: 35.4 % (ref 37–47)
HGB BLD-MCNC: 10.7 GM/DL (ref 12–16)
IMM GRANULOCYTES # BLD AUTO: 0 THOU/MM3 (ref 0–0.07)
IMM GRANULOCYTES NFR BLD AUTO: 0 %
IONIZED CALCIUM, WHOLE BLOOD: 1.29 MMOL/L (ref 1.12–1.32)
LYMPHOCYTES # BLD AUTO: 1.3 THOU/MM3 (ref 1–4.8)
LYMPHOCYTES NFR BLD AUTO: 26 % (ref 15–47)
MCH RBC QN AUTO: 28.7 PG (ref 26–33)
MCHC RBC AUTO-ENTMCNC: 30.2 GM/DL (ref 32.2–35.5)
MCV RBC AUTO: 95 FL (ref 81–99)
MONOCYTES # BLD AUTO: 0.4 THOU/MM3 (ref 0.4–1.3)
MONOCYTES NFR BLD AUTO: 8 % (ref 0–12)
NEUTROPHILS ABSOLUTE: 3.3 THOU/MM3 (ref 1.8–7.7)
NEUTROPHILS NFR BLD AUTO: 63 % (ref 43–75)
PLATELET # BLD AUTO: 157 THOU/MM3 (ref 130–400)
PMV BLD AUTO: 8.9 FL (ref 9.4–12.4)
POTASSIUM BLD-SCNC: 4.5 MEQ/L (ref 3.5–4.9)
PROT SERPL-MCNC: 6.9 G/DL (ref 6.4–8.3)
RBC # BLD AUTO: 3.73 MILL/MM3 (ref 4.2–5.4)
SODIUM BLD-SCNC: 143 MEQ/L (ref 138–146)
TOTAL CO2, WHOLE BLOOD: 26 MEQ/L (ref 23–33)
WBC # BLD AUTO: 5.2 THOU/MM3 (ref 4.8–10.8)

## 2025-03-25 PROCEDURE — 1036F TOBACCO NON-USER: CPT | Performed by: STUDENT IN AN ORGANIZED HEALTH CARE EDUCATION/TRAINING PROGRAM

## 2025-03-25 PROCEDURE — 80047 BASIC METABLC PNL IONIZED CA: CPT

## 2025-03-25 PROCEDURE — 1090F PRES/ABSN URINE INCON ASSESS: CPT | Performed by: STUDENT IN AN ORGANIZED HEALTH CARE EDUCATION/TRAINING PROGRAM

## 2025-03-25 PROCEDURE — 86334 IMMUNOFIX E-PHORESIS SERUM: CPT

## 2025-03-25 PROCEDURE — 82784 ASSAY IGA/IGD/IGG/IGM EACH: CPT

## 2025-03-25 PROCEDURE — 3075F SYST BP GE 130 - 139MM HG: CPT | Performed by: STUDENT IN AN ORGANIZED HEALTH CARE EDUCATION/TRAINING PROGRAM

## 2025-03-25 PROCEDURE — 1159F MED LIST DOCD IN RCRD: CPT | Performed by: STUDENT IN AN ORGANIZED HEALTH CARE EDUCATION/TRAINING PROGRAM

## 2025-03-25 PROCEDURE — 3078F DIAST BP <80 MM HG: CPT | Performed by: STUDENT IN AN ORGANIZED HEALTH CARE EDUCATION/TRAINING PROGRAM

## 2025-03-25 PROCEDURE — 1123F ACP DISCUSS/DSCN MKR DOCD: CPT | Performed by: STUDENT IN AN ORGANIZED HEALTH CARE EDUCATION/TRAINING PROGRAM

## 2025-03-25 PROCEDURE — 99211 OFF/OP EST MAY X REQ PHY/QHP: CPT

## 2025-03-25 PROCEDURE — 84165 PROTEIN E-PHORESIS SERUM: CPT

## 2025-03-25 PROCEDURE — G8400 PT W/DXA NO RESULTS DOC: HCPCS | Performed by: STUDENT IN AN ORGANIZED HEALTH CARE EDUCATION/TRAINING PROGRAM

## 2025-03-25 PROCEDURE — G8420 CALC BMI NORM PARAMETERS: HCPCS | Performed by: STUDENT IN AN ORGANIZED HEALTH CARE EDUCATION/TRAINING PROGRAM

## 2025-03-25 PROCEDURE — 83883 ASSAY NEPHELOMETRY NOT SPEC: CPT

## 2025-03-25 PROCEDURE — 82232 ASSAY OF BETA-2 PROTEIN: CPT

## 2025-03-25 PROCEDURE — G8427 DOCREV CUR MEDS BY ELIG CLIN: HCPCS | Performed by: STUDENT IN AN ORGANIZED HEALTH CARE EDUCATION/TRAINING PROGRAM

## 2025-03-25 PROCEDURE — 99214 OFFICE O/P EST MOD 30 MIN: CPT | Performed by: STUDENT IN AN ORGANIZED HEALTH CARE EDUCATION/TRAINING PROGRAM

## 2025-03-25 PROCEDURE — 84155 ASSAY OF PROTEIN SERUM: CPT

## 2025-03-25 PROCEDURE — 80076 HEPATIC FUNCTION PANEL: CPT

## 2025-03-25 PROCEDURE — 85025 COMPLETE CBC W/AUTO DIFF WBC: CPT

## 2025-03-25 ASSESSMENT — ENCOUNTER SYMPTOMS
NAUSEA: 0
FACIAL SWELLING: 0
CONSTIPATION: 0
ABDOMINAL PAIN: 0
APNEA: 0
CHOKING: 0
SHORTNESS OF BREATH: 0
STRIDOR: 0
EYE DISCHARGE: 0
SINUS PAIN: 0
COLOR CHANGE: 0
COUGH: 0
CHEST TIGHTNESS: 0
ANAL BLEEDING: 0
VOMITING: 0
EYE PAIN: 0
TROUBLE SWALLOWING: 0
ABDOMINAL DISTENTION: 0
DIARRHEA: 0
BLOOD IN STOOL: 0
RECTAL PAIN: 0
WHEEZING: 0
BACK PAIN: 0

## 2025-03-25 NOTE — PROGRESS NOTES
[facility] [place] [facility]  [facility] [place] [facility]  [address]  [address]  [facility] [address]  Dept: [phone]  Loc: [phone]   Hematology/Oncology progress note      Ashwini Pulido   1945     No ref. provider found   Carline Bowen MD       Reason:   Chief Complaint   Patient presents with    Follow-up     Monoclonal gammopathy            HPI: The is a 78-year-old woman with a approximate 3-month history of abnormal serum creatinine.  She has been seen in consultation by Dr.Vivek Weston of nephrology. Work up has been significant for a serum M protein of 0.4 g/dl.      Presents to clinic today for follow-up.  She stated that she is doing well and that she has been following up with Dr. Weston for her chronic kidney disease.  She denies any new weight weight loss, new fatigue, bone pain or recurrent infections she stated that she is doing well overall.    ROS:  Review of Systems   Constitutional:  Negative for activity change, appetite change, chills, diaphoresis, fatigue, fever and unexpected weight change.   HENT:  Negative for drooling, ear discharge, ear pain, facial swelling, hearing loss, mouth sores, nosebleeds, sinus pain and trouble swallowing.    Eyes:  Negative for pain, discharge and visual disturbance.   Respiratory:  Negative for apnea, cough, choking, chest tightness, shortness of breath, wheezing and stridor.    Cardiovascular:  Negative for chest pain, palpitations and leg swelling.   Gastrointestinal:  Negative for abdominal distention, abdominal pain, anal bleeding, blood in stool, constipation, diarrhea, nausea, rectal pain and vomiting.   Endocrine: Negative for cold intolerance, heat intolerance, polydipsia, polyphagia and polyuria.   Genitourinary:  Negative for difficulty urinating, dyspareunia, dysuria, enuresis, flank pain, frequency, genital sores, hematuria, menstrual problem and vaginal bleeding.   Musculoskeletal:  Negative for

## 2025-03-26 LAB
B2 MICROGLOB SERPL-MCNC: 6.5 MG/L
FREE KAPPA/LAMBDA RATIO: 0.94 (ref 0.22–1.74)
IGA SERPL-MCNC: 255 MG/DL (ref 70–400)
IGG SERPL-MCNC: 1330 MG/DL (ref 700–1600)
IGM SERPL-MCNC: 110 MG/DL (ref 40–230)
KAPPA LC FREE SER-MCNC: 99.4 MG/L
LAMBDA LC FREE SERPL-MCNC: 106 MG/L (ref 4.2–27.7)

## 2025-03-29 LAB — IMMUNOFIXATION WITH QUANT: NORMAL

## 2025-04-03 ENCOUNTER — HOSPITAL ENCOUNTER (OUTPATIENT)
Dept: INFUSION THERAPY | Age: 80
Discharge: HOME | End: 2025-04-03

## 2025-04-03 ENCOUNTER — SCHEDULED TELEPHONE ENCOUNTER (OUTPATIENT)
Dept: ONCOLOGY | Age: 80
End: 2025-04-03

## 2025-04-03 DIAGNOSIS — D89.0 POLYCLONAL GAMMOPATHY: Primary | ICD-10-CM

## 2025-04-03 ASSESSMENT — ENCOUNTER SYMPTOMS
ABDOMINAL DISTENTION: 0
VOMITING: 0
COUGH: 0
APNEA: 0
EYE DISCHARGE: 0
CONSTIPATION: 0
CHOKING: 0
EYE PAIN: 0
NAUSEA: 0
COLOR CHANGE: 0
SHORTNESS OF BREATH: 0
TROUBLE SWALLOWING: 0
BACK PAIN: 0
STRIDOR: 0
FACIAL SWELLING: 0
RECTAL PAIN: 0
SINUS PAIN: 0
DIARRHEA: 0
WHEEZING: 0
CHEST TIGHTNESS: 0
BLOOD IN STOOL: 0
ANAL BLEEDING: 0
ABDOMINAL PAIN: 0

## 2025-04-03 NOTE — PROGRESS NOTES
[facility] [place] [facility]  [facility] [place] [facility]  [address]  [address]  [facility] [address]  Dept: [phone]  Loc: [phone]   Hematology/Oncology progress note      Ashwini Pulido   1945     No ref. provider found   Carline Bowen MD       HPI: The is a 79-year-old woman with a approximate 3-month history of abnormal serum creatinine.  She has been seen in consultation by Dr.Vivek Weston of nephrology. Work up has been significant for a serum M protein of 0.4 g/dl.    3/25/25: IgM 110.  IgA 225.  IgG 1330.  Beta-2 microglobulin 6.5.  [place] free light chain 99.4.  Lambda free light chain 106.  MAURO shows normal pattern no monoclonal protein seen hemoglobin 10.7.  Creatinine 2.8.  Ionized calcium 1.29    3/25/2025  ROS:  Review of Systems   Constitutional:  Negative for activity change, appetite change, chills, diaphoresis, fatigue, fever and unexpected weight change.   HENT:  Negative for drooling, ear discharge, ear pain, facial swelling, hearing loss, mouth sores, nosebleeds, sinus pain and trouble swallowing.    Eyes:  Negative for pain, discharge and visual disturbance.   Respiratory:  Negative for apnea, cough, choking, chest tightness, shortness of breath, wheezing and stridor.    Cardiovascular:  Negative for chest pain, palpitations and leg swelling.   Gastrointestinal:  Negative for abdominal distention, abdominal pain, anal bleeding, blood in stool, constipation, diarrhea, nausea, rectal pain and vomiting.   Endocrine: Negative for cold intolerance, heat intolerance, polydipsia, polyphagia and polyuria.   Genitourinary:  Negative for difficulty urinating, dyspareunia, dysuria, enuresis, flank pain, frequency, genital sores, hematuria, menstrual problem and vaginal bleeding.   Musculoskeletal:  Negative for arthralgias, back pain, gait problem, joint swelling, myalgias, neck pain and neck stiffness.   Skin:  Negative for color change, pallor, rash and wound.

## 2025-04-15 ENCOUNTER — HOSPITAL ENCOUNTER (OUTPATIENT)
Dept: CT IMAGING | Age: 80
Discharge: HOME | End: 2025-04-15
Payer: MEDICARE

## 2025-04-15 VITALS
TEMPERATURE: 97.5 F | DIASTOLIC BLOOD PRESSURE: 53 MMHG | BODY MASS INDEX: 21.05 KG/M2 | RESPIRATION RATE: 18 BRPM | HEART RATE: 66 BPM | HEIGHT: 66 IN | WEIGHT: 131 LBS | SYSTOLIC BLOOD PRESSURE: 119 MMHG | OXYGEN SATURATION: 99 %

## 2025-04-15 DIAGNOSIS — D47.2 MONOCLONAL GAMMOPATHY: ICD-10-CM

## 2025-04-15 LAB
BASOPHILS ABSOLUTE: 0 THOU/MM3 (ref 0–0.1)
BASOPHILS NFR BLD AUTO: 0.6 %
CREAT SERPL-MCNC: 2.3 MG/DL (ref 0.5–0.9)
DEPRECATED RDW RBC AUTO: 46.7 FL (ref 35–45)
EOSINOPHIL NFR BLD AUTO: 2.4 %
EOSINOPHILS ABSOLUTE: 0.1 THOU/MM3 (ref 0–0.4)
ERYTHROCYTE [DISTWIDTH] IN BLOOD BY AUTOMATED COUNT: 13.8 % (ref 11.5–14.5)
GFR SERPL CREATININE-BSD FRML MDRD: 21 ML/MIN/1.73M2
HCT VFR BLD AUTO: 39.1 % (ref 37–47)
HGB BLD-MCNC: 11.7 GM/DL (ref 12–16)
IMM GRANULOCYTES # BLD AUTO: 0.01 THOU/MM3 (ref 0–0.07)
IMM GRANULOCYTES NFR BLD AUTO: 0.2 %
LYMPHOCYTES ABSOLUTE: 1.2 THOU/MM3 (ref 1–4.8)
LYMPHOCYTES NFR BLD AUTO: 22.4 %
MCH RBC QN AUTO: 28.1 PG (ref 26–33)
MCHC RBC AUTO-ENTMCNC: 29.9 GM/DL (ref 32.2–35.5)
MCV RBC AUTO: 94 FL (ref 81–99)
MONOCYTES ABSOLUTE: 0.4 THOU/MM3 (ref 0.4–1.3)
MONOCYTES NFR BLD AUTO: 7.9 %
NEUTROPHILS ABSOLUTE: 3.6 THOU/MM3 (ref 1.8–7.7)
NEUTROPHILS NFR BLD AUTO: 66.5 %
NRBC BLD AUTO-RTO: 0 /100 WBC
PLATELET # BLD AUTO: 122 THOU/MM3 (ref 130–400)
PMV BLD AUTO: 10.6 FL (ref 9.4–12.4)
RBC # BLD AUTO: 4.16 MILL/MM3 (ref 4.2–5.4)
WBC # BLD AUTO: 5.4 THOU/MM3 (ref 4.8–10.8)

## 2025-04-15 PROCEDURE — 88271 CYTOGENETICS DNA PROBE: CPT

## 2025-04-15 PROCEDURE — 88313 SPECIAL STAINS GROUP 2: CPT

## 2025-04-15 PROCEDURE — 88342 IMHCHEM/IMCYTCHM 1ST ANTB: CPT

## 2025-04-15 PROCEDURE — 88341 IMHCHEM/IMCYTCHM EA ADD ANTB: CPT

## 2025-04-15 PROCEDURE — 77012 CT SCAN FOR NEEDLE BIOPSY: CPT

## 2025-04-15 PROCEDURE — 88275 CYTOGENETICS 100-300: CPT

## 2025-04-15 PROCEDURE — 88237 TISSUE CULTURE BONE MARROW: CPT

## 2025-04-15 PROCEDURE — 36415 COLL VENOUS BLD VENIPUNCTURE: CPT

## 2025-04-15 PROCEDURE — 88368 INSITU HYBRIDIZATION MANUAL: CPT

## 2025-04-15 PROCEDURE — 88184 FLOWCYTOMETRY/ TC 1 MARKER: CPT

## 2025-04-15 PROCEDURE — 88264 CHROMOSOME ANALYSIS 20-25: CPT

## 2025-04-15 PROCEDURE — 85025 COMPLETE CBC W/AUTO DIFF WBC: CPT

## 2025-04-15 PROCEDURE — 88185 FLOWCYTOMETRY/TC ADD-ON: CPT

## 2025-04-15 PROCEDURE — 82565 ASSAY OF CREATININE: CPT

## 2025-04-15 PROCEDURE — 6360000002 HC RX W HCPCS: Performed by: RADIOLOGY

## 2025-04-15 PROCEDURE — 88305 TISSUE EXAM BY PATHOLOGIST: CPT

## 2025-04-15 PROCEDURE — 88369 M/PHMTRC ALYSISHQUANT/SEMIQ: CPT

## 2025-04-15 PROCEDURE — 2580000003 HC RX 258: Performed by: RADIOLOGY

## 2025-04-15 RX ORDER — MIDAZOLAM HYDROCHLORIDE 1 MG/ML
INJECTION, SOLUTION INTRAMUSCULAR; INTRAVENOUS PRN
Status: COMPLETED | OUTPATIENT
Start: 2025-04-15 | End: 2025-04-15

## 2025-04-15 RX ORDER — FENTANYL CITRATE 50 UG/ML
INJECTION, SOLUTION INTRAMUSCULAR; INTRAVENOUS PRN
Status: COMPLETED | OUTPATIENT
Start: 2025-04-15 | End: 2025-04-15

## 2025-04-15 RX ORDER — SODIUM CHLORIDE 450 MG/100ML
INJECTION, SOLUTION INTRAVENOUS CONTINUOUS
Status: DISCONTINUED | OUTPATIENT
Start: 2025-04-15 | End: 2025-04-16 | Stop reason: HOSPADM

## 2025-04-15 RX ADMIN — FENTANYL CITRATE 50 MCG: 50 INJECTION, SOLUTION INTRAMUSCULAR; INTRAVENOUS at 10:41

## 2025-04-15 RX ADMIN — SODIUM CHLORIDE: 0.45 INJECTION, SOLUTION INTRAVENOUS at 08:24

## 2025-04-15 RX ADMIN — MIDAZOLAM 1 MG: 1 INJECTION INTRAMUSCULAR; INTRAVENOUS at 10:41

## 2025-04-15 ASSESSMENT — PAIN SCALES - GENERAL: PAINLEVEL_OUTOF10: 0

## 2025-04-15 ASSESSMENT — PAIN - FUNCTIONAL ASSESSMENT: PAIN_FUNCTIONAL_ASSESSMENT: NONE - DENIES PAIN

## 2025-04-15 NOTE — PROGRESS NOTES
Formulation and discussion of sedation / procedure plans, risks, benefits, side effects and alternatives with patient and/or responsible adult completed.    History and Physical reviewed and unchanged.    Electronically signed by Neeraj Carrizales MD on 4/15/25 at 10:35 AM EDT

## 2025-04-15 NOTE — PROGRESS NOTES
1108: Patient back from procedure-tolerated well. States no pain noted. Bandaid clean, dry, intact. No redness or edema noted. Vitals as charted. Provided with snack and beverage.     1130: Bandaid clean, dry, intact. No redness or edema noted. Vitals as charted.    1145: Bandaid clean, dry, intact. No redness or edema noted. Vitals as charted.    1200: Bandaid clean, dry, intact. No redness or edema noted. Vitals as charted.    1230: Bandaid clean, dry, intact. No redness or edema noted. Vitals as charted.    1250: :Bandaid clean, dry, intact. No redness or edema noted.  AVS reviewed with patient, voiced understanding. Patient discharged in wheelchair.

## 2025-04-15 NOTE — DISCHARGE INSTRUCTIONS
POST BIOPSY DISCHARGE INSTRUCTION SHEET    DIET: As tolerated    ACTIVITY:  Rest at home on sofa, bed or recliner today.  Bathroom privileges only today.  Limit any exertion (pushing or pulling) today.  No lifting for 3 days.  No driving today.  Check biopsy site frequently today.  Resume any blood thinners in 24 hours.  Keep band aid clean & dry - replace as needed, may remove in 48 hours.    RETURN TO NEAREST EMERGENCY ROOM IF YOU HAVE ANY OF THE FOLLOWING:  Sign of bleeding, swelling, drainage from biopsy site or severe pain (slight discomfort to be expected) around biopsy site.  Repeated nausea/vomiting/abdominal pain.  Elevated temperature above 101 degrees.  Shortness of breath.  Chest pain.    Keep scheduled appointment with your physician.      SEDATION/ANALGESIA INFORMATION HOME GOING ADVICE    Review the following information with the patient prior to the procedure.  Sedation/agalgesia is used during short medical procedures under controlled supervision.  The medication will produce a strong relaxation.  You will be able to hear, speak and follow instructions, but you memory and alertness will be decreased.  You will be able to swallow and breathe on your own.  During sedation/analgesia you blood pressure, hear and breathing will be watched closely.  After the procedure, you may not remember what was said or done.    Procedure: bone marrow biopsy    Date: 4/15/25  You may have the following effects from the medication.  Drowsiness, dizziness, sleepiness or confusion.  Difficulty remembering or delayed reaction times.  Loss of fine muscle control or difficulty with your balance especially while walking.  Difficulty focusing or blurred vision.  You may not be aware of slight changes in your behavior and/or your reaction time because of the medication used during the procedure.  Therefore you should follow these instructions.  Have someone responsible help you with your care.  Do not drive for 24 hours.  Do

## 2025-04-15 NOTE — H&P
[facility]  Sedation/Analgesia History & Physical    Pt Name: Ashwini Pulido  MRN: [id number]  YOB: 1945  Provider Performing Procedure: Neeraj Carrizales MD, MD  Primary Care Physician: Carline Bowen MD    Formulation and discussion of sedation / procedure plans, risks, benefits, side effects and alternatives with patient and/or responsible adult completed.    PRE-PROCEDURE   DNR-CCA/DNR-CC []Yes [x]No  Brief History/Pre-Procedure Diagnosis: MGUS, R/O Multiple myeloma          MEDICAL HISTORY  []CAD/Valve  []Liver Disease  []Lung Disease []Diabetes  []Hypertension []Renal Disease  []Additional information:       has a past medical history of Chronic kidney disease, Hypertension, and Kyphosis.    SURGICAL HISTORY   has a past surgical history that includes Cholecystectomy (N/A, 11/2022) and CT BIOPSY RENAL (3/7/2025).  Additional information:       ALLERGIES   Allergies as of 04/15/2025    (No Known Allergies)     Additional information:       MEDICATIONS   Coumadin Use Last 5 Days [x]No []Yes  Antiplatelet drug therapy use last 5 days  [x]No []Yes  Other anticoagulant use last 5 days  [x]No []Yes    Current Outpatient Medications:     losartan (COZAAR) 25 MG tablet, Take 1 tablet by mouth daily, Disp: 90 tablet, Rfl: 1    amLODIPine (NORVASC) 5 MG tablet, Take 1 tablet by mouth 2 times daily, Disp: , Rfl:     metoprolol tartrate (LOPRESSOR) 50 MG tablet, Take 1 tablet by mouth 2 times daily, Disp: , Rfl:     rosuvastatin (CRESTOR) 40 MG tablet, Take 1 tablet by mouth daily, Disp: , Rfl:     aspirin 81 MG EC tablet, Take 1 tablet by mouth daily, Disp: , Rfl:     Current Facility-Administered Medications:     0.45 % sodium chloride infusion, , IntraVENous, Continuous, Neeraj Carrizales MD, Last Rate: 20 mL/hr at 04/15/25 0824, New Bag at 04/15/25 0824  Prior to Admission medications    Medication Sig Start Date End Date Taking? Authorizing Provider   losartan (COZAAR) 25 MG

## 2025-04-15 NOTE — PROGRESS NOTES
0810: Patient arrived ambulatory with  for bone marrow biopsy. Patient has held aspirin for 5 days. Patient has been NPO since last night. Procedure explained to patient-voiced understanding.   Patient rights and responsibilities offered to patient.   Blood work collected and sent to lab per order.  IV hydration started.   Resting in bed, call light in reach.                    _m___ Safety:       (Environmental)  [place] to environment  Ensure ID band is correct and in place/ allergy band as needed  Assess for fall risk  Initiate fall precautions as applicable (fall band, side rails, etc.)  Call light within reach  Bed in low position/ wheels locked    _m___ Pain:       Assess pain level and characteristics  Administer analgesics as ordered  Assess effectiveness of pain management and report to MD as needed    _m___ Knowledge Deficit:  Assess baseline knowledge  Provide teaching at level of understanding  Provide teaching via preferred learning method  Evaluate teaching effectiveness    _m___ Hemodynamic/Respiratory Status:       (Pre and Post Procedure Monitoring)  Assess/Monitor vital signs and LOC  Assess Baseline SpO2 prior to any sedation  Obtain weight/height  Assess vital signs/ LOC until patient meets discharge criteria  Monitor procedure site and notify MD of any issues

## 2025-04-15 NOTE — PROGRESS NOTES
1015 Patient received in CT scanning for bone marrow biopsy pre-procedure assessment with family at bedside. Monitor applied.   1030 Dr. Carrizales here; spoke to patient. This procedure has been fully reviewed with the patient and written informed consent has been obtained. Histology called to CT area for procedure.  1033 Patient assisted to CT table and pre scan started.   1035 Histology arrived to CT.  1043 Procedure started with Dr. Carrizales.   1052 Samples collected and given to histologist for further review.   1055 Procedure completed; patient tolerated well. Post scan obtained.   1057 Bacitracin oint, band aid to site; no bleeding noted.  1058 Patient on cart; comfort ensured.  1100 Report called to OPN and patient taken to OPN via cart with family at bedside. Pt alert and oriented x3; follows commands. Skin pink, warm, and dry. Respirations easy, regular, and nonlabored.

## 2025-04-17 LAB — LEUKEMIA/LYMPHOMA PHENO BONE MARROW: NORMAL

## 2025-04-23 LAB
CHROM ANALY INTERPHASE BLD/MAR FISH-IMP: NORMAL
CHROMOSOME, BONE MARROW: NORMAL
EER MULTIPLE MYELOMA FISH: NORMAL

## 2025-04-29 ENCOUNTER — TELEMEDICINE (OUTPATIENT)
Dept: ONCOLOGY | Age: 80
End: 2025-04-29
Payer: MEDICARE

## 2025-04-29 ENCOUNTER — HOSPITAL ENCOUNTER (OUTPATIENT)
Dept: INFUSION THERAPY | Age: 80
Discharge: HOME | End: 2025-04-29
Payer: MEDICARE

## 2025-04-29 VITALS
HEIGHT: 66 IN | RESPIRATION RATE: 18 BRPM | BODY MASS INDEX: 21.15 KG/M2 | OXYGEN SATURATION: 95 % | SYSTOLIC BLOOD PRESSURE: 144 MMHG | HEART RATE: 60 BPM | DIASTOLIC BLOOD PRESSURE: 65 MMHG | WEIGHT: 131.6 LBS

## 2025-04-29 VITALS
SYSTOLIC BLOOD PRESSURE: 144 MMHG | DIASTOLIC BLOOD PRESSURE: 65 MMHG | OXYGEN SATURATION: 95 % | RESPIRATION RATE: 18 BRPM | HEART RATE: 60 BPM

## 2025-04-29 DIAGNOSIS — D89.0 POLYCLONAL GAMMOPATHY: Primary | ICD-10-CM

## 2025-04-29 PROCEDURE — 3078F DIAST BP <80 MM HG: CPT | Performed by: STUDENT IN AN ORGANIZED HEALTH CARE EDUCATION/TRAINING PROGRAM

## 2025-04-29 PROCEDURE — 1090F PRES/ABSN URINE INCON ASSESS: CPT | Performed by: STUDENT IN AN ORGANIZED HEALTH CARE EDUCATION/TRAINING PROGRAM

## 2025-04-29 PROCEDURE — 1036F TOBACCO NON-USER: CPT | Performed by: STUDENT IN AN ORGANIZED HEALTH CARE EDUCATION/TRAINING PROGRAM

## 2025-04-29 PROCEDURE — G8400 PT W/DXA NO RESULTS DOC: HCPCS | Performed by: STUDENT IN AN ORGANIZED HEALTH CARE EDUCATION/TRAINING PROGRAM

## 2025-04-29 PROCEDURE — G8427 DOCREV CUR MEDS BY ELIG CLIN: HCPCS | Performed by: STUDENT IN AN ORGANIZED HEALTH CARE EDUCATION/TRAINING PROGRAM

## 2025-04-29 PROCEDURE — G8420 CALC BMI NORM PARAMETERS: HCPCS | Performed by: STUDENT IN AN ORGANIZED HEALTH CARE EDUCATION/TRAINING PROGRAM

## 2025-04-29 PROCEDURE — 1159F MED LIST DOCD IN RCRD: CPT | Performed by: STUDENT IN AN ORGANIZED HEALTH CARE EDUCATION/TRAINING PROGRAM

## 2025-04-29 PROCEDURE — 99211 OFF/OP EST MAY X REQ PHY/QHP: CPT

## 2025-04-29 PROCEDURE — 1123F ACP DISCUSS/DSCN MKR DOCD: CPT | Performed by: STUDENT IN AN ORGANIZED HEALTH CARE EDUCATION/TRAINING PROGRAM

## 2025-04-29 PROCEDURE — 99214 OFFICE O/P EST MOD 30 MIN: CPT | Performed by: STUDENT IN AN ORGANIZED HEALTH CARE EDUCATION/TRAINING PROGRAM

## 2025-04-29 PROCEDURE — 3077F SYST BP >= 140 MM HG: CPT | Performed by: STUDENT IN AN ORGANIZED HEALTH CARE EDUCATION/TRAINING PROGRAM

## 2025-04-29 ASSESSMENT — ENCOUNTER SYMPTOMS
FACIAL SWELLING: 0
CHOKING: 0
ABDOMINAL PAIN: 0
BACK PAIN: 0
VOMITING: 0
NAUSEA: 0
APNEA: 0
COLOR CHANGE: 0
SINUS PAIN: 0
WHEEZING: 0
CHEST TIGHTNESS: 0
COUGH: 0
ABDOMINAL DISTENTION: 0
TROUBLE SWALLOWING: 0
DIARRHEA: 0
BLOOD IN STOOL: 0
ANAL BLEEDING: 0
EYE PAIN: 0
SHORTNESS OF BREATH: 0
CONSTIPATION: 0
EYE DISCHARGE: 0
RECTAL PAIN: 0
STRIDOR: 0

## 2025-04-29 NOTE — PROGRESS NOTES
gammopathy  -UPEP: Normal  -9/17/2024: Beta-2 microglobulin 5.25.  SPEP, serum showed IgG lambda monoclonal band.  No M protein level reported.  IgG level 1350.  IgM 128.  IgA 259.  [place] free light chain 84.4.  Lambda free light chain 68.6..      3/25/25: IgM 110.  IgA 225.  IgG 1330.  Beta-2 microglobulin 6.5.  [place] free light chain 99.4.  Lambda free light chain 106.  Ratio 0.94 MAURO shows normal pattern no monoclonal protein seen hemoglobin 10.7.  Creatinine 2.8.  Ionized calcium 1.29    4/15/25: Bone marrow core, aspirate, and clot, site not specified: Normocellular bone marrow (10-20%) with orderly trilineage   hematopoiesis and polyclonal plasma cells (3%). Adequate iron stores. No significant evidence of leukemia, lymphoma, plasma cell   neoplasm, myelodysplasia, or myeloproliferative neoplasm. Serum immunofixation studies are negative for a monoclonal protein   Multiple myeloma FISH panel: negative. Karyotype normal female       I discussed with the patient that her bone marrow biopsy is negative for monoclonal plasma cell.  No evidence of plasma cell disorder.  No evidence of malignancy.  I advised her to follow-up with nephrology for her chronic kidney disease    breast cancer screening  Not up-to-date.  Patient is not interested to get mammogram    CKD  --biopsy of the kidney done on 3/12/2025 showed moderate arterial sclerosis.  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 Greater than 50% of time was spent on counseling and coordinating care.   All patient questions answered. Pt voiced understanding. Patient agreed with treatment plan. Follow up as directed. Patient instructed to call for questions or concerns.     Lashanda Odell MD   Medical

## 2025-05-09 ENCOUNTER — TELEPHONE (OUTPATIENT)
Dept: NEPHROLOGY | Age: 80
End: 2025-05-09

## 2025-05-09 DIAGNOSIS — N18.4 CKD (CHRONIC KIDNEY DISEASE), STAGE IV (HCC): Primary | ICD-10-CM

## 2025-05-09 DIAGNOSIS — N28.1 RENAL CYST: ICD-10-CM

## 2025-05-09 DIAGNOSIS — I12.9 HYPERTENSIVE RENAL DISEASE, STAGE 1 THROUGH STAGE 4 OR UNSPECIFIED CHRONIC KIDNEY DISEASE: ICD-10-CM

## 2025-05-09 NOTE — TELEPHONE ENCOUNTER
Spoke to pt, she understands that she needs to get labs next week. She will go to [place].    Lab order pending

## 2025-05-09 NOTE — TELEPHONE ENCOUNTER
----- Message from Dr. Connor Matta MD sent at 5/8/2025  8:31 PM EDT -----  Pls inform patient that note reviewed from hematology  Repeat BMP

## 2025-05-19 ENCOUNTER — OFFICE VISIT (OUTPATIENT)
Dept: NEPHROLOGY | Age: 80
End: 2025-05-19
Payer: MEDICARE

## 2025-05-19 VITALS
HEIGHT: 66 IN | WEIGHT: 131 LBS | HEART RATE: 64 BPM | BODY MASS INDEX: 21.05 KG/M2 | SYSTOLIC BLOOD PRESSURE: 146 MMHG | DIASTOLIC BLOOD PRESSURE: 60 MMHG | OXYGEN SATURATION: 94 %

## 2025-05-19 DIAGNOSIS — N10 ACUTE INTERSTITIAL NEPHRITIS: ICD-10-CM

## 2025-05-19 DIAGNOSIS — N18.4 CKD (CHRONIC KIDNEY DISEASE), STAGE IV (HCC): Primary | ICD-10-CM

## 2025-05-19 PROCEDURE — 3077F SYST BP >= 140 MM HG: CPT | Performed by: INTERNAL MEDICINE

## 2025-05-19 PROCEDURE — 1036F TOBACCO NON-USER: CPT | Performed by: INTERNAL MEDICINE

## 2025-05-19 PROCEDURE — G8400 PT W/DXA NO RESULTS DOC: HCPCS | Performed by: INTERNAL MEDICINE

## 2025-05-19 PROCEDURE — 1159F MED LIST DOCD IN RCRD: CPT | Performed by: INTERNAL MEDICINE

## 2025-05-19 PROCEDURE — 99213 OFFICE O/P EST LOW 20 MIN: CPT | Performed by: INTERNAL MEDICINE

## 2025-05-19 PROCEDURE — 1123F ACP DISCUSS/DSCN MKR DOCD: CPT | Performed by: INTERNAL MEDICINE

## 2025-05-19 PROCEDURE — 3078F DIAST BP <80 MM HG: CPT | Performed by: INTERNAL MEDICINE

## 2025-05-19 PROCEDURE — G8427 DOCREV CUR MEDS BY ELIG CLIN: HCPCS | Performed by: INTERNAL MEDICINE

## 2025-05-19 PROCEDURE — G8420 CALC BMI NORM PARAMETERS: HCPCS | Performed by: INTERNAL MEDICINE

## 2025-05-19 PROCEDURE — 1090F PRES/ABSN URINE INCON ASSESS: CPT | Performed by: INTERNAL MEDICINE

## 2025-05-19 NOTE — PROGRESS NOTES
[facility] [place] [facility]  [facility]  [address]  [address]  [place] [address]  Dept: [phone]  Loc: [phone]  Office Progress Note  5/19/2025 2:51 PM      Pt Name:    Ashwini Pulido  YOB: 1945  Primary Care Physician:  Carline Bowen MD     Chief Complaint:   Chief Complaint   Patient presents with    Follow-up     Per Dr. Matta--to discuss interstitial nephritis, CKD, further plans/management        Background Information/Interval History:   The patient is a 79 y.o. with history of CKD IV, hypertension who is here for follow-up evaluation of elevated creatinine.  Past medical history also includes dyslipidemia and chronic back pain.  Has CKD. Old labs reviewed. Creat in the past been around 1.7 to 1.8.  Ex-smoking 1ppd x 40+ years, quit about 10 years ago. Has taken mobic in the past. Hard of hearing.     March 2025: Pt is here for follow-up.  She is seeing hematology. Bp is stable had kidney biopsy recently. No urinary complaints. No hematuria. Pt is here with grand-daughter. Pt reports she was recently taking some aleve or advil and I've strongly advised her to avoid these. Previously she was taking mobic which she is no longer doing so. Grand-daughter reports pt does not drink much water and has some affinity for salt intake. No urinary infection symptoms. Morales snot drink much water.     May 2025: Pt saw me in March 2025 but repeat labs were not sent to me. Recently received a note from hematologist. We then decided to bring her in to go over the plan. She was started on prednisone 40 mg daily. She is tolerating it well. She is very hard of hearing. Feels okay. No new complaints. Bp is in 130s. Seen by hematology recently. Kidney biopsy showed interstitial nephritis ?NSAId use. Started on prednisone. Discussed in detail with patient and  today.      Past History:  Past Medical History:   Diagnosis Date    Chronic kidney

## 2025-07-03 ENCOUNTER — TELEPHONE (OUTPATIENT)
Dept: NEPHROLOGY | Age: 80
End: 2025-07-03

## 2025-07-03 DIAGNOSIS — N18.4 CKD (CHRONIC KIDNEY DISEASE), STAGE IV (HCC): Primary | ICD-10-CM

## 2025-07-03 NOTE — TELEPHONE ENCOUNTER
Spoke to pt, she stated that she takes prednisone 10 mg daily and that there has not been any changes.

## 2025-07-03 NOTE — TELEPHONE ENCOUNTER
Pt states that she has been tapering off the prednisone per the doctors instructions. She stated that she would like to get off the prednisone before she leaves for vacation on 7/21/25. She stated that every since she has been on the prednisone she has not been feeling that great.  Labs have been routed to Dr. Matta.

## 2025-07-03 NOTE — TELEPHONE ENCOUNTER
Sonia Oden, DO  [facility] Clinical Staff  Her kidney function does look quite a bit worse though from 2 weeks ago.  Any changes?  Did she reduce her prednisone in that time period? How much is she on now?

## 2025-07-03 NOTE — TELEPHONE ENCOUNTER
Spoke to pt, she stated that she understood to start taking 20 mg daily of prednisone, hold losartan, and repeat labs before her 7/11/25 appt.     MAR updated

## 2025-07-08 ENCOUNTER — HOSPITAL ENCOUNTER (INPATIENT)
Age: 80
LOS: 3 days | Discharge: HOME | End: 2025-07-11
Attending: EMERGENCY MEDICINE | Admitting: INTERNAL MEDICINE
Payer: MEDICARE

## 2025-07-08 ENCOUNTER — TELEPHONE (OUTPATIENT)
Dept: NEPHROLOGY | Age: 80
End: 2025-07-08

## 2025-07-08 ENCOUNTER — APPOINTMENT (OUTPATIENT)
Dept: ULTRASOUND IMAGING | Age: 80
End: 2025-07-08
Payer: MEDICARE

## 2025-07-08 DIAGNOSIS — N17.9 AKI (ACUTE KIDNEY INJURY): Primary | ICD-10-CM

## 2025-07-08 PROBLEM — N12 INTERSTITIAL NEPHRITIS: Status: ACTIVE | Noted: 2025-07-08

## 2025-07-08 PROBLEM — N14.2: Status: ACTIVE | Noted: 2025-07-08

## 2025-07-08 PROBLEM — E87.20 METABOLIC ACIDOSIS: Status: ACTIVE | Noted: 2025-07-08

## 2025-07-08 PROBLEM — N18.9 CKD (CHRONIC KIDNEY DISEASE): Status: ACTIVE | Noted: 2025-07-08

## 2025-07-08 LAB
AMORPH SED URNS QL MICRO: ABNORMAL
ANION GAP SERPL CALC-SCNC: 16 MEQ/L (ref 8–16)
BACTERIA: ABNORMAL
BASOPHILS ABSOLUTE: 0 THOU/MM3 (ref 0–0.1)
BASOPHILS NFR BLD AUTO: 0.1 %
BILIRUB UR QL STRIP: NEGATIVE
BUN SERPL-MCNC: 64 MG/DL (ref 8–23)
CALCIUM SERPL-MCNC: 10.2 MG/DL (ref 8.8–10.2)
CASTS #/AREA URNS LPF: ABNORMAL /LPF
CASTS #/AREA URNS LPF: ABNORMAL /LPF
CHARACTER UR: CLEAR
CHARCOAL URNS QL MICRO: ABNORMAL
CHLORIDE 24H UR-SRATE: 43 MEQ/L
CHLORIDE SERPL-SCNC: 107 MEQ/L (ref 98–111)
CO2 SERPL-SCNC: 18 MEQ/L (ref 22–29)
COLOR UR: YELLOW
CREAT SERPL-MCNC: 4.1 MG/DL (ref 0.5–0.9)
CREAT UR-MCNC: 68.7 MG/DL
CRYSTALS URNS QL MICRO: ABNORMAL
DEPRECATED RDW RBC AUTO: 53.6 FL (ref 35–45)
EKG ATRIAL RATE: 71 BPM
EKG P AXIS: 85 DEGREES
EKG P-R INTERVAL: 138 MS
EKG Q-T INTERVAL: 426 MS
EKG QRS DURATION: 136 MS
EKG QTC CALCULATION (BAZETT): 462 MS
EKG R AXIS: 71 DEGREES
EKG T AXIS: 70 DEGREES
EKG VENTRICULAR RATE: 71 BPM
EOSINOPHIL NFR BLD AUTO: 0 %
EOSINOPHILS ABSOLUTE: 0 THOU/MM3 (ref 0–0.4)
EPITHELIAL CELLS, UA: ABNORMAL /HPF
ERYTHROCYTE [DISTWIDTH] IN BLOOD BY AUTOMATED COUNT: 15.9 % (ref 11.5–14.5)
GFR SERPL CREATININE-BSD FRML MDRD: 11 ML/MIN/1.73M2
GLUCOSE SERPL-MCNC: 149 MG/DL (ref 74–109)
GLUCOSE UR QL STRIP.AUTO: NEGATIVE MG/DL
HCT VFR BLD AUTO: 39.2 % (ref 37–47)
HGB BLD-MCNC: 12.6 GM/DL (ref 12–16)
HGB UR QL STRIP.AUTO: ABNORMAL
IMM GRANULOCYTES # BLD AUTO: 0.1 THOU/MM3 (ref 0–0.07)
IMM GRANULOCYTES NFR BLD AUTO: 1.2 %
KETONES UR QL STRIP.AUTO: NEGATIVE
LEUKOCYTE ESTERASE UR QL STRIP.AUTO: NEGATIVE
LYMPHOCYTES ABSOLUTE: 0.3 THOU/MM3 (ref 1–4.8)
LYMPHOCYTES NFR BLD AUTO: 4.2 %
MCH RBC QN AUTO: 29.5 PG (ref 26–33)
MCHC RBC AUTO-ENTMCNC: 32.1 GM/DL (ref 32.2–35.5)
MCV RBC AUTO: 91.8 FL (ref 81–99)
MONOCYTES ABSOLUTE: 0.3 THOU/MM3 (ref 0.4–1.3)
MONOCYTES NFR BLD AUTO: 3.5 %
NEUTROPHILS ABSOLUTE: 7.4 THOU/MM3 (ref 1.8–7.7)
NEUTROPHILS NFR BLD AUTO: 91 %
NITRITE UR QL STRIP.AUTO: NEGATIVE
NRBC BLD AUTO-RTO: 0 /100 WBC
OSMOLALITY SERPL CALC.SUM OF ELEC: 302.4 MOSMOL/KG (ref 275–300)
PH UR STRIP.AUTO: 5.5 [PH] (ref 5–9)
PLATELET # BLD AUTO: 216 THOU/MM3 (ref 130–400)
PMV BLD AUTO: 9.7 FL (ref 9.4–12.4)
POTASSIUM SERPL-SCNC: 4.3 MEQ/L (ref 3.5–5.2)
POTASSIUM UR-SCNC: 31.5 MEQ/L
PROT UR STRIP.AUTO-MCNC: 100 MG/DL
PROT UR-MCNC: 40 MG/DL
PROT/CREAT 24H UR: 0.58 MG/G{CREAT}
RBC # BLD AUTO: 4.27 MILL/MM3 (ref 4.2–5.4)
RBC #/AREA URNS HPF: ABNORMAL /HPF
RENAL EPI CELLS #/AREA URNS HPF: ABNORMAL /[HPF]
SODIUM SERPL-SCNC: 141 MEQ/L (ref 135–145)
SODIUM UR-SCNC: 61 MEQ/L
SP GR UR REFRACT.AUTO: 1.01 (ref 1–1.03)
UROBILINOGEN UR QL STRIP.AUTO: 0.2 EU/DL (ref 0–1)
WBC # BLD AUTO: 8.1 THOU/MM3 (ref 4.8–10.8)
WBC #/AREA URNS HPF: ABNORMAL /HPF
YEAST LIKE FUNGI URNS QL MICRO: ABNORMAL

## 2025-07-08 PROCEDURE — 82436 ASSAY OF URINE CHLORIDE: CPT

## 2025-07-08 PROCEDURE — 84133 ASSAY OF URINE POTASSIUM: CPT

## 2025-07-08 PROCEDURE — 80048 BASIC METABOLIC PNL TOTAL CA: CPT

## 2025-07-08 PROCEDURE — 84156 ASSAY OF PROTEIN URINE: CPT

## 2025-07-08 PROCEDURE — 99222 1ST HOSP IP/OBS MODERATE 55: CPT | Performed by: INTERNAL MEDICINE

## 2025-07-08 PROCEDURE — 6360000002 HC RX W HCPCS: Performed by: INTERNAL MEDICINE

## 2025-07-08 PROCEDURE — 85025 COMPLETE CBC W/AUTO DIFF WBC: CPT

## 2025-07-08 PROCEDURE — 6370000000 HC RX 637 (ALT 250 FOR IP): Performed by: INTERNAL MEDICINE

## 2025-07-08 PROCEDURE — 2500000003 HC RX 250 WO HCPCS: Performed by: INTERNAL MEDICINE

## 2025-07-08 PROCEDURE — 99285 EMERGENCY DEPT VISIT HI MDM: CPT

## 2025-07-08 PROCEDURE — 2580000003 HC RX 258: Performed by: INTERNAL MEDICINE

## 2025-07-08 PROCEDURE — 82570 ASSAY OF URINE CREATININE: CPT

## 2025-07-08 PROCEDURE — 76770 US EXAM ABDO BACK WALL COMP: CPT

## 2025-07-08 PROCEDURE — 93005 ELECTROCARDIOGRAM TRACING: CPT | Performed by: INTERNAL MEDICINE

## 2025-07-08 PROCEDURE — 36415 COLL VENOUS BLD VENIPUNCTURE: CPT

## 2025-07-08 PROCEDURE — 2580000003 HC RX 258: Performed by: EMERGENCY MEDICINE

## 2025-07-08 PROCEDURE — 84300 ASSAY OF URINE SODIUM: CPT

## 2025-07-08 PROCEDURE — 99223 1ST HOSP IP/OBS HIGH 75: CPT | Performed by: INTERNAL MEDICINE

## 2025-07-08 PROCEDURE — 81001 URINALYSIS AUTO W/SCOPE: CPT

## 2025-07-08 PROCEDURE — 1200000000 HC SEMI PRIVATE

## 2025-07-08 RX ORDER — PREDNISONE 20 MG/1
20 TABLET ORAL DAILY
Status: DISCONTINUED | OUTPATIENT
Start: 2025-07-09 | End: 2025-07-11

## 2025-07-08 RX ORDER — SODIUM CHLORIDE 9 MG/ML
INJECTION, SOLUTION INTRAVENOUS CONTINUOUS
Status: DISCONTINUED | OUTPATIENT
Start: 2025-07-08 | End: 2025-07-08

## 2025-07-08 RX ORDER — LOSARTAN POTASSIUM 25 MG/1
25 TABLET ORAL DAILY
Status: DISCONTINUED | OUTPATIENT
Start: 2025-07-08 | End: 2025-07-11

## 2025-07-08 RX ORDER — ROSUVASTATIN CALCIUM 10 MG/1
10 TABLET, COATED ORAL DAILY
Status: DISCONTINUED | OUTPATIENT
Start: 2025-07-09 | End: 2025-07-11 | Stop reason: HOSPADM

## 2025-07-08 RX ORDER — 0.9 % SODIUM CHLORIDE 0.9 %
1000 INTRAVENOUS SOLUTION INTRAVENOUS ONCE
Status: COMPLETED | OUTPATIENT
Start: 2025-07-08 | End: 2025-07-08

## 2025-07-08 RX ORDER — HEPARIN SODIUM 5000 [USP'U]/ML
5000 INJECTION, SOLUTION INTRAVENOUS; SUBCUTANEOUS EVERY 8 HOURS SCHEDULED
Status: DISCONTINUED | OUTPATIENT
Start: 2025-07-08 | End: 2025-07-11 | Stop reason: HOSPADM

## 2025-07-08 RX ORDER — CALCIUM CARBONATE 500(1250)
500 TABLET ORAL 2 TIMES DAILY
Status: DISCONTINUED | OUTPATIENT
Start: 2025-07-08 | End: 2025-07-11 | Stop reason: HOSPADM

## 2025-07-08 RX ORDER — ROSUVASTATIN CALCIUM 20 MG/1
40 TABLET, COATED ORAL DAILY
Status: DISCONTINUED | OUTPATIENT
Start: 2025-07-09 | End: 2025-07-08

## 2025-07-08 RX ORDER — AMLODIPINE BESYLATE 5 MG/1
5 TABLET ORAL 2 TIMES DAILY
Status: DISCONTINUED | OUTPATIENT
Start: 2025-07-08 | End: 2025-07-11 | Stop reason: HOSPADM

## 2025-07-08 RX ORDER — ASPIRIN 81 MG/1
81 TABLET ORAL DAILY
Status: DISCONTINUED | OUTPATIENT
Start: 2025-07-09 | End: 2025-07-11 | Stop reason: HOSPADM

## 2025-07-08 RX ORDER — METOPROLOL TARTRATE 50 MG
50 TABLET ORAL 2 TIMES DAILY
Status: DISCONTINUED | OUTPATIENT
Start: 2025-07-08 | End: 2025-07-11 | Stop reason: HOSPADM

## 2025-07-08 RX ADMIN — HEPARIN SODIUM 5000 UNITS: 5000 INJECTION INTRAVENOUS; SUBCUTANEOUS at 21:55

## 2025-07-08 RX ADMIN — AMLODIPINE BESYLATE 5 MG: 5 TABLET ORAL at 21:54

## 2025-07-08 RX ADMIN — SODIUM CHLORIDE 1000 ML: 0.9 INJECTION, SOLUTION INTRAVENOUS at 16:06

## 2025-07-08 RX ADMIN — CALCIUM 500 MG: 500 TABLET ORAL at 21:55

## 2025-07-08 RX ADMIN — METOPROLOL TARTRATE 50 MG: 50 TABLET, FILM COATED ORAL at 21:55

## 2025-07-08 RX ADMIN — Medication: at 19:43

## 2025-07-08 ASSESSMENT — PAIN DESCRIPTION - LOCATION: LOCATION: FLANK

## 2025-07-08 ASSESSMENT — PAIN - FUNCTIONAL ASSESSMENT
PAIN_FUNCTIONAL_ASSESSMENT: NONE - DENIES PAIN
PAIN_FUNCTIONAL_ASSESSMENT: 0-10

## 2025-07-08 ASSESSMENT — PAIN DESCRIPTION - ORIENTATION: ORIENTATION: LEFT

## 2025-07-08 ASSESSMENT — PAIN SCALES - GENERAL: PAINLEVEL_OUTOF10: 3

## 2025-07-08 NOTE — ED PROVIDER NOTES
[facility]  EMERGENCY DEPARTMENT ENCOUNTER          Pt Name: Ashwini Pulido  MRN: [id number]  Birthdate 1945  Date of evaluation: 7/8/2025  Emergency Physician: Reynold Ramirez DO    CHIEF COMPLAINT       Chief Complaint   Patient presents with    Abnormal Lab     History obtained from chart review, the patient, and .      HISTORY OF PRESENT ILLNESS    HPI  Ashwini Pulido is a 79 y.o. female who presents to the emergency department for evaluation of abnormal lab.She has had an upward trending Cr over the last month despite OP management per Dr. Matta. Reports 50 ounce of water per day. Mild pedal edema. Recently on steroids. She presents today with a CR. 4.4. Denies. Nausea or vomiting. No diarrhea. States urination unchanged. No abdominal pain.   The patient has no other acute complaints at this time.          REVIEW OF SYSTEMS   Review of Systems   Constitutional:  Negative for chills and fever.   HENT:  Negative for congestion and rhinorrhea.    Respiratory:  Negative for cough and shortness of breath.    Cardiovascular:  Positive for leg swelling (bilateral Pedal). Negative for chest pain and palpitations.   Gastrointestinal:  Negative for abdominal pain and nausea.   Genitourinary:  Negative for dysuria and frequency.   Musculoskeletal:  Negative for myalgias.   Neurological:  Negative for headaches.         PAST MEDICAL AND SURGICAL HISTORY     Past Medical History:   Diagnosis Date    Chronic kidney disease     Hypertension     Kyphosis      Past Surgical History:   Procedure Laterality Date    CHOLECYSTECTOMY N/A 11/2022    CT BIOPSY BONE MARROW  4/15/2025    CT BIOPSY BONE MARROW 4/15/2025 [facility] CT SCAN    CT BIOPSY RENAL  3/7/2025    CT BIOPSY RENAL 3/7/2025 [facility] CT SCAN         MEDICATIONS     Current Facility-Administered Medications:     sodium chloride 0.9 % bolus 1,000 mL, 1,000 mL, IntraVENous, Once, Reynold Ramirez DO, Last Rate: 495.9 mL/hr at 07/08/25

## 2025-07-08 NOTE — ED NOTES
ED to inpatient nurses report      Chief Complaint:  Chief Complaint   Patient presents with    Abnormal Lab     Present to ED from: home    MOA:     LOC: alert and orientated to name, place, date  Mobility: Independent  Oxygen Baseline: RA    Current needs required: RA     Code Status:   No Order    What abnormal results were found and what did you give/do to treat them? Abnormal Lab  Any procedures or intervention occur? Labs, 0.9 bolus    Mental Status:  Level of Consciousness: Alert (0)    Psych Assessment:        Vitals:  Patient Vitals for the past 24 hrs:   BP Temp Temp src Pulse Resp SpO2 Height Weight   07/08/25 1544 (!) 139/59 98.5 °F (36.9 °C) Oral 69 16 93 % 1.676 m (5' 6\") 59 kg (130 lb)        LDAs:   Peripheral IV 07/08/25 Right Antecubital (Active)       Ambulatory Status:  No data recorded    Diagnosis:  DISPOSITION Decision To Admit 07/08/2025 04:18:09 PM   Final diagnoses:   LEVAR (acute kidney injury)        Consults:  None     Pain Score:  Pain Assessment  Pain Assessment: 0-10  Pain Level: 3  Pain Location: Flank  Pain Orientation: Left    C-SSRS:   Risk of Suicide: No Risk    Sepsis Screening:       [place] Fall Risk:       Swallow Screening        Preferred Language:   English      ALLERGIES     Patient has no known allergies.    SURGICAL HISTORY       Past Surgical History:   Procedure Laterality Date    CHOLECYSTECTOMY N/A 11/2022    CT BIOPSY BONE MARROW  4/15/2025    CT BIOPSY BONE MARROW 4/15/2025 [facility] CT SCAN    CT BIOPSY RENAL  3/7/2025    CT BIOPSY RENAL 3/7/2025 [facility] CT SCAN       PAST MEDICAL HISTORY       Past Medical History:   Diagnosis Date    Chronic kidney disease     Hypertension     Kyphosis            Electronically signed by Richmond Ferreira RN on 7/8/2025 at 4:24 PM

## 2025-07-08 NOTE — ED TRIAGE NOTES
Pt to ED sent from PCP with c/o abnormal lab. Pt reports that the lab had \"something to do with my kidney\". Pt denies dysuria but reports flank pain on the left sides intermittently over the past couple of weeks. Pt feet are swollen upon arrival. VSS. Pt is alert and oriented.

## 2025-07-08 NOTE — TELEPHONE ENCOUNTER
Spoke to pt, she stated that she will go to the ED. She is going to talk to her  if she should go to [place] or [facility].

## 2025-07-08 NOTE — H&P
[facility]--HOSPITALIST GROUP    Hospitalist HISTORY AND PHYSICAL dictated by Rashida Barrett MD on 2025     Patient ID:Ashwini Pulido  is 79 y.o.       1945 MRN: [id number]    Admit date: 2025  Primary Care Physician: Carline Bowen MD   Patient Care Team:  Carline Bowen MD as PCP - General (Family Medicine)  Tel: [phone]  Code Status:  Full Code      Assessment:    Principal Problem:    CKD (chronic kidney disease) stage 4, GFR 15-29 ml/min ([facility])  Active Problems:    Hypertension    Hypercholesteremia    Interstitial nephritis  Resolved Problems:    * No resolved hospital problems. *  LEVAR on CKD stage IV      Plan:    Start 0.9 NS at 75 mL/h.  Monitor renal function.  Hold losartan.  EKG.  Per pharmacy decrease Crestor dose to 10 mg daily due to poor renal function.  Nephrology consult.  ADULT DIET; Regular; Low Sodium (2 gm); Low Potassium (Less than 3000 mg/day); Low Phosphorus (Less than 1000 mg); Less than 60 gm; 2000 ml  Code Status:  Full Code  [START ON 2025] aspirin, 81 mg, Daily  calcium elemental, 500 mg, BID  [Held by provider] losartan, 25 mg, Daily  metoprolol tartrate, 50 mg, BID  [START ON 2025] predniSONE, 20 mg, Daily  amLODIPine, 5 mg, BID  heparin (porcine), 5,000 Units, 3 times per day  [START ON 2025] rosuvastatin, 10 mg, Daily      Reconcile home medications.  Admitted in stable yet guarded condition.  Heparin sc for DVT prophylaxis.  IP CONSULT TO NEPHROLOGY    HPI: Ashwini Pulido is a 79 y.o.  female who presents with Abnormal Lab    Per report:  79-year-old female following with Dr. Matta. Sent into the ED for worsening creatinine. She has had multiple test in the last month. Her creatinine has been steadily increasing from 2. To 3.9 on  to 4.4 today. Sent in per Dr. Matta for admission and IV hydration. Repeat blood work and UA ordered with IV fluids. Potassium appears normal last was 4.1. Overall the

## 2025-07-08 NOTE — TELEPHONE ENCOUNTER
----- Message from Dr. Connor Matta MD sent at 7/8/2025 12:59 PM EDT -----  Her creatinine is rising from 2.3 to 3.9 and now upto 4.4  Please send patient to the emergency room. She will likely need admission for inpatient IV fluid hydration.  ----- Message -----  From: Carine De Dios CMA ([facility])  Sent: 7/8/2025  10:36 AM EDT  To: Connor Matta MD

## 2025-07-08 NOTE — CONSULTS
[facility]    [address] [place], [place] [id number]  [phone]     Hospital Consult  2025 6:49 PM    Pt Name:    Ashwini Pulido  MRN:     [id number]   [id number]  YOB: 1945  Admit Date:    2025  3:42 PM  Primary Care Physician:  Carline Bowen MD    CSN Number:   [id number]    Reason for Consult:  LEVAR on CKD  Requesting provider:  Hospitalist    History:   The patient is a 79 y.o. female with with history of hypertension, CKD, ex smoking 1 pack/day for 40+ years quit 10 years ago, history of heavy nonsteroidal use including Mobic.  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  She was referred to hematology.  She was diagnosed with MGUS.  Eventually underwent kidney biopsy in 2025 which showed moderate arteriolosclerosis with focal interstitial inflammation concerning for drug-induced interstitial nephritis.  There was no lesion in the kidney biopsy from monoclonal gammopathy.  She was subsequently started on prednisone.  Her serum creatinine was around 2.3-2.5 but now has slowly gone up to 4.1.  And she was referred to the emergency room for further evaluation and was admitted.  Her serum creatinine was 2.3 but then recently went up to 3.9 this morning was 4.4.  Patient admits to not drinking enough water.  Denies any chest pain or shortness of breath.  No urinary complaints.   at the bedside.    Past Medical History:  Past Medical History:   Diagnosis Date    Chronic kidney disease     Hypertension     Kyphosis        Past Surgical History:  Past Surgical History:   Procedure Laterality Date    CHOLECYSTECTOMY N/A 2022    CT BIOPSY BONE MARROW  4/15/2025    CT BIOPSY BONE MARROW 4/15/2025 [facility] CT SCAN    CT BIOPSY RENAL  3/7/2025    CT BIOPSY RENAL 3/7/2025 [facility] CT SCAN       Family History:  Family History   Problem Relation Age of Onset    Heart Disease Mother          at 82    Colon Cancer Father          at 89    No  Known Problems Sister         A&W    No Known Problems Sister         A&W    No Known Problems Sister         A&W    No Known Problems Sister         A&W    No Known Problems Sister         A&W    No Known Problems Brother         A&W       Social History:  Social History     Socioeconomic History    Marital status:      Spouse name: Not on file    Number of children: 3    Years of education: Not on file    Highest education level: Not on file   Occupational History    Not on file   Tobacco Use    Smoking status: Former     Current packs/day: 1.00     Average packs/day: 1 pack/day for 60.5 years (60.5 ttl pk-yrs)     Types: Cigarettes     Start date: 1965    Smokeless tobacco: Never   Substance and Sexual Activity    Alcohol use: Yes     Alcohol/week: 1.0 standard drink of alcohol     Types: 1 Cans of beer per week     Comment: casual    Drug use: Never    Sexual activity: Not on file   Other Topics Concern    Not on file   Social History Narrative    Not on file     Social Drivers of Health     Financial Resource Strain: Not on file   Food Insecurity: No Food Insecurity (7/8/2025)    Hunger Vital Sign     Worried About Running Out of Food in the Last Year: Never true     Ran Out of Food in the Last Year: Never true   Transportation Needs: No Transportation Needs (7/8/2025)    PRAPARE - Transportation     Lack of Transportation (Medical): No     Lack of Transportation (Non-Medical): No   Physical Activity: Not on file   Stress: Not on file   Social Connections: Not on file   Intimate Partner Violence: Not on file   Housing Stability: Low Risk  (7/8/2025)    Housing Stability Vital Sign     Unable to Pay for Housing in the Last Year: No     Number of Times Moved in the Last Year: 0     Homeless in the Last Year: No       Home Meds:  Prior to Admission medications    Medication Sig Start Date End Date Taking? Authorizing Provider   predniSONE (DELTASONE) 20 MG tablet Take 2 tablets by mouth daily  Patient

## 2025-07-08 NOTE — PROCEDURES
PROCEDURE NOTE  Date: 7/8/2025   Name: Ashwini Pulido  YOB: 1945    Procedures EKG completed, given to RN

## 2025-07-09 LAB
ANION GAP SERPL CALC-SCNC: 15 MEQ/L (ref 8–16)
BUN SERPL-MCNC: 59 MG/DL (ref 8–23)
CALCIUM SERPL-MCNC: 9.3 MG/DL (ref 8.8–10.2)
CHLORIDE SERPL-SCNC: 111 MEQ/L (ref 98–111)
CK SERPL-CCNC: 174 U/L (ref 26–192)
CO2 SERPL-SCNC: 18 MEQ/L (ref 22–29)
CREAT SERPL-MCNC: 3.9 MG/DL (ref 0.5–0.9)
DEPRECATED RDW RBC AUTO: 52.4 FL (ref 35–45)
ERYTHROCYTE [DISTWIDTH] IN BLOOD BY AUTOMATED COUNT: 15.9 % (ref 11.5–14.5)
GFR SERPL CREATININE-BSD FRML MDRD: 11 ML/MIN/1.73M2
GLUCOSE SERPL-MCNC: 87 MG/DL (ref 74–109)
HCT VFR BLD AUTO: 33 % (ref 37–47)
HGB BLD-MCNC: 10.6 GM/DL (ref 12–16)
MCH RBC QN AUTO: 29.3 PG (ref 26–33)
MCHC RBC AUTO-ENTMCNC: 32.1 GM/DL (ref 32.2–35.5)
MCV RBC AUTO: 91.2 FL (ref 81–99)
PLATELET # BLD AUTO: 178 THOU/MM3 (ref 130–400)
PMV BLD AUTO: 10.1 FL (ref 9.4–12.4)
POTASSIUM SERPL-SCNC: 3.6 MEQ/L (ref 3.5–5.2)
RBC # BLD AUTO: 3.62 MILL/MM3 (ref 4.2–5.4)
SODIUM SERPL-SCNC: 144 MEQ/L (ref 135–145)
TSH SERPL DL<=0.05 MIU/L-ACNC: 3.47 UIU/ML (ref 0.27–4.2)
WBC # BLD AUTO: 7.3 THOU/MM3 (ref 4.8–10.8)

## 2025-07-09 PROCEDURE — 2580000003 HC RX 258: Performed by: INTERNAL MEDICINE

## 2025-07-09 PROCEDURE — 84443 ASSAY THYROID STIM HORMONE: CPT

## 2025-07-09 PROCEDURE — 1200000000 HC SEMI PRIVATE

## 2025-07-09 PROCEDURE — 2500000003 HC RX 250 WO HCPCS: Performed by: INTERNAL MEDICINE

## 2025-07-09 PROCEDURE — 36415 COLL VENOUS BLD VENIPUNCTURE: CPT

## 2025-07-09 PROCEDURE — 6360000002 HC RX W HCPCS: Performed by: INTERNAL MEDICINE

## 2025-07-09 PROCEDURE — 80048 BASIC METABOLIC PNL TOTAL CA: CPT

## 2025-07-09 PROCEDURE — 82550 ASSAY OF CK (CPK): CPT

## 2025-07-09 PROCEDURE — 99232 SBSQ HOSP IP/OBS MODERATE 35: CPT | Performed by: INTERNAL MEDICINE

## 2025-07-09 PROCEDURE — 85027 COMPLETE CBC AUTOMATED: CPT

## 2025-07-09 PROCEDURE — 6370000000 HC RX 637 (ALT 250 FOR IP): Performed by: INTERNAL MEDICINE

## 2025-07-09 RX ADMIN — CALCIUM 500 MG: 500 TABLET ORAL at 07:58

## 2025-07-09 RX ADMIN — HEPARIN SODIUM 5000 UNITS: 5000 INJECTION INTRAVENOUS; SUBCUTANEOUS at 13:43

## 2025-07-09 RX ADMIN — HEPARIN SODIUM 5000 UNITS: 5000 INJECTION INTRAVENOUS; SUBCUTANEOUS at 06:10

## 2025-07-09 RX ADMIN — Medication: at 08:40

## 2025-07-09 RX ADMIN — HEPARIN SODIUM 5000 UNITS: 5000 INJECTION INTRAVENOUS; SUBCUTANEOUS at 21:33

## 2025-07-09 RX ADMIN — AMLODIPINE BESYLATE 5 MG: 5 TABLET ORAL at 21:33

## 2025-07-09 RX ADMIN — PREDNISONE 20 MG: 20 TABLET ORAL at 07:58

## 2025-07-09 RX ADMIN — Medication: at 19:24

## 2025-07-09 RX ADMIN — METOPROLOL TARTRATE 50 MG: 50 TABLET, FILM COATED ORAL at 07:58

## 2025-07-09 RX ADMIN — METOPROLOL TARTRATE 50 MG: 50 TABLET, FILM COATED ORAL at 21:33

## 2025-07-09 RX ADMIN — ASPIRIN 81 MG: 81 TABLET, COATED ORAL at 08:01

## 2025-07-09 RX ADMIN — AMLODIPINE BESYLATE 5 MG: 5 TABLET ORAL at 07:58

## 2025-07-09 RX ADMIN — ROSUVASTATIN CALCIUM 10 MG: 10 TABLET, FILM COATED ORAL at 07:59

## 2025-07-09 RX ADMIN — CALCIUM 500 MG: 500 TABLET ORAL at 21:33

## 2025-07-09 ASSESSMENT — ENCOUNTER SYMPTOMS
ABDOMINAL PAIN: 0
SHORTNESS OF BREATH: 0
RHINORRHEA: 0
COUGH: 0
NAUSEA: 0

## 2025-07-09 NOTE — DISCHARGE INSTRUCTIONS
Follow-up with your primary care provider (PCP) in 1 to 2 weeks.   Follow-up with Dr. Matta  in 2 weeks.

## 2025-07-09 NOTE — PROGRESS NOTES
[facility]   Nephrology progress note  7/9/2025, 9:36 AM      Pt Name:    Ashwini Pulido  MRN:     [id number]     YOB: 1945  Admit Date:    7/8/2025  3:42 PM    Chief Complaint: Nephrology following for LEVAR/CKD    Subjective:  Patient was seen and examined this morning  Reports she feels somewhat better today  No chest pain or shortness of breath  Daughter at bedside    Objective:  24HR INTAKE/OUTPUT:    Intake/Output Summary (Last 24 hours) at 7/9/2025 0936  Last data filed at 7/9/2025 0625  Gross per 24 hour   Intake 970.12 ml   Output 0 ml   Net 970.12 ml         I/O last 3 completed shifts:  In: 970.1 [I.V.:970.1]  Out: 0   No intake/output data recorded.   Admission weight: 59 kg (130 lb)  Wt Readings from Last 3 Encounters:   07/08/25 59 kg (130 lb)   05/19/25 59.4 kg (131 lb)   04/29/25 59.7 kg (131 lb 9.6 oz)        Vitals :   Vitals:    07/08/25 2154 07/08/25 2324 07/09/25 0348 07/09/25 0802   BP: (!) 133/54 (!) 138/58 (!) 134/56 106/86   Pulse: 74 78 72 72   Resp:  16 18 16   Temp:  98.4 °F (36.9 °C) 98.2 °F (36.8 °C) 98 °F (36.7 °C)   TempSrc:  Oral Oral Oral   SpO2:  91% 90% 91%   Weight:       Height:           Physical examination  General Appearance: alert and cooperative with exam, appears comfortable, no distress  Mouth/Throat: Oral mucosa moist  Neck: No JVD  Lungs: Air entry B/L, no rales, no use of accessory muscles  Extremities: No significant LE edema    Medications:  Infusion:    sodium bicarbonate 75 mEq in sodium chloride 0.45 % 1,000 mL infusion 100 mL/hr at 07/09/25 0840     Meds:    aspirin  81 mg Oral Daily    calcium elemental  500 mg Oral BID    [Held by provider] losartan  25 mg Oral Daily    metoprolol tartrate  50 mg Oral BID    predniSONE  20 mg Oral Daily    amLODIPine  5 mg Oral BID    heparin (porcine)  5,000 Units SubCUTAneous 3 times per day    rosuvastatin  10 mg Oral Daily     Meds prn:      Lab Data :  CBC:   Recent Labs

## 2025-07-09 NOTE — PROGRESS NOTES
[facility]--HOSPITALIST GROUP    Hospitalist PROGRESS NOTE dictated by Rashida Barrett MD on 2025    Patient ID: Ashwini Pulido is 79 y.o. and presently in room -A  : 1945 MRN: [id number]    Admit date: 2025  Primary Care Physician: Carline Bowen MD   Patient Care Team:  Carline Bowen MD as PCP - General (Family Medicine)  Tel: [phone]  Admitting Physician: Rashida Barrett MD   Code Status:  Full Code    Ashwini Pulido is a 79 y.o.  female who presented with Abnormal Lab    Room: -A  Admit date: 2025    Per report:  79-year-old female following with Dr. Matta. Sent into the ED for worsening creatinine. She has had multiple test in the last month. Her creatinine has been steadily increasing from 2. To 3.9 on  to 4.4 today. Sent in per Dr. Matta for admission and IV hydration. Repeat blood work and UA ordered with IV fluids. Potassium appears normal last was 4.1. Overall the patient is mildly hypertensive at 139/59 afebrile at 98.5 respiration 16 pulse of 69 SpO2 is 93 on room air.  she has some peripheral edema bilateral otherwise appears euvolemic to slightly dry.   -------------------     HPI: Patient with a history of CKD stage IV: Interstitial nephritis on prednisone sent in by her nephrologist for increasing creatinine.  Patient has history of CKD and says recently started on prednisone by her nephrologist.  Admits to not drinking enough water.  Denies any urinary symptoms.     No presyncope or syncope, chest pain, shortness of breath or cough.     LEVAR on CKD stage IV: GFR 11, BUN 64 and creatinine 1.4 today.  CO2 was 18.  Baseline GFR 21 on 4/15/2025.    EKG on admission revealed sinus rhythm with PACs at a rate of 71/min.  QTc 462.  ------------    2025: Patient seen with daughter and RN at bedside.  Patient denies any urinary symptoms.  No chest pain shortness of breath or cough.    GFR 11.  BUN 59,

## 2025-07-09 NOTE — NURSE NAVIGATOR
Case Management Assessment Initial Evaluation    Date/Time of Evaluation: 2025 8:09 AM  Assessment Completed by: Geetha Bland    If patient is discharged prior to next notation, then this note serves as note for discharge by case management.    Patient Name: Ashwini Pulido                   YOB: 1945  Diagnosis: CKD (chronic kidney disease) [N18.9]  LEVAR (acute kidney injury) [N17.9]                   Date / Time: 2025  3:42 PM  Location: [address] [place] [address]     Patient Admission Status: Inpatient   Readmission Risk Low 0-14, Mod 15-19), High > 20: Readmission Risk Score: 17.2    Current PCP: Carline Bowen MD  Health Care Decision Makers:     Additional Case Management Notes: presented to [facility] for abnormal labs of up trending Cr  4.4 over last month per management of Dr Matta. Bilat pedal edema. IVF bicarb gtt. has had some intermittent left side flank pain over last couple weeks. Nephrology to see.    Procedures: none    Imagin/8 Renal US: Increased renal echogenicity consistent with chronic medical renal disease. Numerous renal cysts incidentally noted     Patient Goals/Plan/Treatment Preferences: plan to go home with . Denies any needs for discharge. Will monitor       25 1400   Service Assessment   Patient Orientation Alert and Oriented   Cognition Alert   History Provided By Patient   Primary Caregiver Self   Accompanied By/Relationship    Support Systems Spouse/Significant Other;Children   Patient's Healthcare Decision Maker is: Legal Next of Kin   PCP Verified by CM Yes   Last Visit to PCP Within last year   Prior Functional Level Independent in ADLs/IADLs   Current Functional Level Independent in ADLs/IADLs   Can patient return to prior living arrangement Yes   Ability to make needs known: Good   Family able to assist with home care needs: Yes   Would you like for me to discuss the discharge plan with any other family members/significant others, and if

## 2025-07-09 NOTE — PLAN OF CARE
Problem: Safety - Adult  Goal: Free from fall injury  7/8/2025 2313 by Meghna Black, RN  Outcome: Progressing  7/8/2025 1724 by Kali Bennett RN  Outcome: Progressing     Problem: Discharge Planning  Goal: Discharge to home or other facility with appropriate resources  Outcome: Progressing     Problem: Pain  Goal: Verbalizes/displays adequate comfort level or baseline comfort level  Outcome: Progressing

## 2025-07-10 LAB
ANION GAP SERPL CALC-SCNC: 12 MEQ/L (ref 8–16)
BUN SERPL-MCNC: 53 MG/DL (ref 8–23)
CALCIUM SERPL-MCNC: 9.2 MG/DL (ref 8.8–10.2)
CHLORIDE SERPL-SCNC: 110 MEQ/L (ref 98–111)
CO2 SERPL-SCNC: 23 MEQ/L (ref 22–29)
CREAT SERPL-MCNC: 3.7 MG/DL (ref 0.5–0.9)
GFR SERPL CREATININE-BSD FRML MDRD: 12 ML/MIN/1.73M2
GLUCOSE SERPL-MCNC: 89 MG/DL (ref 74–109)
POTASSIUM SERPL-SCNC: 4.3 MEQ/L (ref 3.5–5.2)
SODIUM SERPL-SCNC: 145 MEQ/L (ref 135–145)

## 2025-07-10 PROCEDURE — 6370000000 HC RX 637 (ALT 250 FOR IP): Performed by: INTERNAL MEDICINE

## 2025-07-10 PROCEDURE — 6360000002 HC RX W HCPCS: Performed by: INTERNAL MEDICINE

## 2025-07-10 PROCEDURE — 2580000003 HC RX 258: Performed by: INTERNAL MEDICINE

## 2025-07-10 PROCEDURE — 2500000003 HC RX 250 WO HCPCS: Performed by: INTERNAL MEDICINE

## 2025-07-10 PROCEDURE — 80048 BASIC METABOLIC PNL TOTAL CA: CPT

## 2025-07-10 PROCEDURE — 36415 COLL VENOUS BLD VENIPUNCTURE: CPT

## 2025-07-10 PROCEDURE — 99232 SBSQ HOSP IP/OBS MODERATE 35: CPT | Performed by: INTERNAL MEDICINE

## 2025-07-10 PROCEDURE — 1200000000 HC SEMI PRIVATE

## 2025-07-10 RX ORDER — SODIUM CHLORIDE 9 MG/ML
INJECTION, SOLUTION INTRAVENOUS CONTINUOUS
Status: DISCONTINUED | OUTPATIENT
Start: 2025-07-10 | End: 2025-07-11 | Stop reason: HOSPADM

## 2025-07-10 RX ADMIN — HEPARIN SODIUM 5000 UNITS: 5000 INJECTION INTRAVENOUS; SUBCUTANEOUS at 05:55

## 2025-07-10 RX ADMIN — SODIUM CHLORIDE: 0.9 INJECTION, SOLUTION INTRAVENOUS at 12:06

## 2025-07-10 RX ADMIN — ROSUVASTATIN CALCIUM 10 MG: 10 TABLET, FILM COATED ORAL at 09:22

## 2025-07-10 RX ADMIN — HEPARIN SODIUM 5000 UNITS: 5000 INJECTION INTRAVENOUS; SUBCUTANEOUS at 15:53

## 2025-07-10 RX ADMIN — CALCIUM 500 MG: 500 TABLET ORAL at 21:28

## 2025-07-10 RX ADMIN — HEPARIN SODIUM 5000 UNITS: 5000 INJECTION INTRAVENOUS; SUBCUTANEOUS at 21:31

## 2025-07-10 RX ADMIN — CALCIUM 500 MG: 500 TABLET ORAL at 09:22

## 2025-07-10 RX ADMIN — AMLODIPINE BESYLATE 5 MG: 5 TABLET ORAL at 09:22

## 2025-07-10 RX ADMIN — ASPIRIN 81 MG: 81 TABLET, COATED ORAL at 09:21

## 2025-07-10 RX ADMIN — PREDNISONE 20 MG: 20 TABLET ORAL at 09:22

## 2025-07-10 RX ADMIN — AMLODIPINE BESYLATE 5 MG: 5 TABLET ORAL at 21:28

## 2025-07-10 RX ADMIN — METOPROLOL TARTRATE 50 MG: 50 TABLET, FILM COATED ORAL at 21:28

## 2025-07-10 RX ADMIN — Medication: at 06:08

## 2025-07-10 RX ADMIN — METOPROLOL TARTRATE 50 MG: 50 TABLET, FILM COATED ORAL at 09:22

## 2025-07-10 ASSESSMENT — PAIN SCALES - GENERAL
PAINLEVEL_OUTOF10: 0
PAINLEVEL_OUTOF10: 0

## 2025-07-10 NOTE — PROGRESS NOTES
[facility]--HOSPITALIST GROUP    Hospitalist PROGRESS NOTE dictated by Rashida Barrett MD on 7/10/2025    Patient ID: Ashwini Pulido is 79 y.o. and presently in room -A  : 1945 MRN: [id number]    Admit date: 2025  Primary Care Physician: aCrline Bowen MD   Patient Care Team:  Carline Bowen MD as PCP - General (Family Medicine)  Tel: [phone]  Admitting Physician: Rashida Barrett MD   Code Status:  Full Code    Ashwini Pulido is a 79 y.o.  female who presented with Abnormal Lab    Room: -  Admit date: 2025    Per report:  79-year-old female following with Dr. Matta. Sent into the ED for worsening creatinine. She has had multiple test in the last month. Her creatinine has been steadily increasing from 2. To 3.9 on  to 4.4 today. Sent in per Dr. Matta for admission and IV hydration. Repeat blood work and UA ordered with IV fluids. Potassium appears normal last was 4.1. Overall the patient is mildly hypertensive at 139/59 afebrile at 98.5 respiration 16 pulse of 69 SpO2 is 93 on room air.  she has some peripheral edema bilateral otherwise appears euvolemic to slightly dry.   -------------------     HPI: Patient with a history of CKD stage IV: Interstitial nephritis on prednisone sent in by her nephrologist for increasing creatinine.  Patient has history of CKD and says recently started on prednisone by her nephrologist.  Admits to not drinking enough water.  Denies any urinary symptoms.     No presyncope or syncope, chest pain, shortness of breath or cough.     LEVAR on CKD stage IV: GFR 11, BUN 64 and creatinine 1.4 today.  CO2 was 18.  Baseline GFR 21 on 4/15/2025.    EKG on admission revealed sinus rhythm with PACs at a rate of 71/min.  QTc 462.  ------------    2025: Patient seen with daughter and RN at bedside.  Patient denies any urinary symptoms.  No chest pain shortness of breath or cough.    GFR 11.  BUN 59,  \"INR\" in the last 72 hours.No results found for: \"DDIMER\"No results found for: \"BNP\"  troponinsNo results found for: \"TROPONINI\"      D Dimer: No results for input(s): \"DDIMER\" in the last 72 hours.  Troponin T No results for input(s): \"TROPONINT\" in the last 72 hours.  ProBNP   No results found for requested labs within last 30 days.     ProBNP Invalid input(s): \"PRO-BNP\"  Lactic acid:Invalid input(s): \"LACTIC ACID\"      PT/INR: No results for input(s): \"PROTIME\", \"INR\" in the last 72 hours.  APTT: No results for input(s): \"APTT\" in the last 72 hours.      ESR: No results found for: \"SEDRATE\"  CRP: No results found for: \"CRP\"  Folate and B12: No results found for: \"[id number]\", No results found for: \"FOLATE\"  Thyroid Studies:   Lab Results   Component Value Date    TSH 3.47 07/09/2025     D-Dimer: No results found for: \"DDIMER\"    Lactic acid: No components found for: \"LACT\"     Troponin T No results for input(s): \"TROPHS\" in the last 72 hours.    Troponins:     No components found for: \"TROP\"    No results found for: \"TROPONINI\"    No results found for requested labs within last 30 days.    Cardiac Enzymes:    No results found for requested labs within last 30 days.    No results found for: \"CKMB\"    ProBNP:    No components found for: \"NTBNP\"      Urine Sodium:   No components found for: \"ARI\"  Urine Potassium:  No results found for: \"KUR\"  Urine Chloride:  No results found for: \"CLUR\"  Urine Osmolarity: No results found for: \"OSMOU\"  Urine Protein:   No results found for: \"TPU\"  Urine Creatinine:   No results found for: \"LABCREA\"  Urine Eosinophils:  No components found for: \"UEOS\"    Thyroid Studies:   No results found for: \"T4\"  No results found for: \"T3\"  Lab Results   Component Value Date/Time    TSH 3.47 07/09/2025 05:39 AM       Lab Results   Component Value Date    TSH 3.47 07/09/2025       HbA1c  No components found for: \"HA1CC\"    Lipids:  No results found for: \"CHOLESTEROL\", \"HDL\", \"LDL\",

## 2025-07-10 NOTE — PLAN OF CARE
Problem: Safety - Adult  Goal: Free from fall injury  7/9/2025 2310 by Meghna Black RN  Outcome: Progressing  7/9/2025 0926 by Kali Bennett RN  Outcome: Progressing     Problem: Discharge Planning  Goal: Discharge to home or other facility with appropriate resources  7/9/2025 2310 by Meghna Black RN  Outcome: Progressing  7/9/2025 0926 by Kali Bennett RN  Outcome: Progressing     Problem: Pain  Goal: Verbalizes/displays adequate comfort level or baseline comfort level  7/9/2025 2310 by Meghna Black RN  Outcome: Progressing  7/9/2025 0926 by Kali Bennett RN  Outcome: Progressing

## 2025-07-10 NOTE — PROGRESS NOTES
[facility]   Nephrology progress note  7/10/2025, 11:50 AM      Pt Name:    Ashwini Pulido  MRN:     [id number]     YOB: 1945  Admit Date:    7/8/2025  3:42 PM    Chief Complaint: Nephrology following for LEVAR/CKD    Subjective:  Patient was seen and examined this morning  No chest pain or shortness of breath    Objective:  24HR INTAKE/OUTPUT:    Intake/Output Summary (Last 24 hours) at 7/10/2025 1150  Last data filed at 7/10/2025 0926  Gross per 24 hour   Intake 720 ml   Output --   Net 720 ml         I/O last 3 completed shifts:  In: 1450.1 [P.O.:480; I.V.:970.1]  Out: 0   I/O this shift:  In: 240 [P.O.:240]  Out: -    Admission weight: 59 kg (130 lb)  Wt Readings from Last 3 Encounters:   07/08/25 59 kg (130 lb)   05/19/25 59.4 kg (131 lb)   04/29/25 59.7 kg (131 lb 9.6 oz)        Vitals :   Vitals:    07/09/25 2313 07/10/25 0428 07/10/25 0915 07/10/25 1132   BP: (!) 136/57 (!) 140/50 (!) 124/51 (!) 98/57   Pulse: 77 70 67 63   Resp: 16 16 14 16   Temp: 98.1 °F (36.7 °C) 98.2 °F (36.8 °C) 99.3 °F (37.4 °C) 98.3 °F (36.8 °C)   TempSrc: Oral Oral Oral Oral   SpO2: 90% 91% 92% 90%   Weight:       Height:           Physical examination  General Appearance: alert and cooperative with exam, appears comfortable, no distress  Mouth/Throat: Oral mucosa moist  Neck: No JVD  Lungs: Air entry B/L, no rales, no use of accessory muscles  Extremities: No significant LE edema    Medications:  Infusion:    sodium bicarbonate 75 mEq in sodium chloride 0.45 % 1,000 mL infusion 100 mL/hr at 07/10/25 0608     Meds:    aspirin  81 mg Oral Daily    calcium elemental  500 mg Oral BID    [Held by provider] losartan  25 mg Oral Daily    metoprolol tartrate  50 mg Oral BID    predniSONE  20 mg Oral Daily    amLODIPine  5 mg Oral BID    heparin (porcine)  5,000 Units SubCUTAneous 3 times per day    rosuvastatin  10 mg Oral Daily     Meds prn:      Lab Data :  CBC:   Recent Labs     07/08/25  1600  cyst    Connor Matta MD  [facility]    This report has been created using voice recognition software. It may contain minor errors which are inherent in voice recognition technology

## 2025-07-11 VITALS
RESPIRATION RATE: 16 BRPM | BODY MASS INDEX: 20.89 KG/M2 | TEMPERATURE: 98 F | HEART RATE: 73 BPM | WEIGHT: 130 LBS | HEIGHT: 66 IN | OXYGEN SATURATION: 91 % | SYSTOLIC BLOOD PRESSURE: 110 MMHG | DIASTOLIC BLOOD PRESSURE: 50 MMHG

## 2025-07-11 LAB
25(OH)D3 SERPL-MCNC: 25 NG/ML (ref 30–100)
ANION GAP SERPL CALC-SCNC: 13 MEQ/L (ref 8–16)
BUN SERPL-MCNC: 48 MG/DL (ref 8–23)
CALCIUM SERPL-MCNC: 9 MG/DL (ref 8.8–10.2)
CHLORIDE SERPL-SCNC: 109 MEQ/L (ref 98–111)
CO2 SERPL-SCNC: 22 MEQ/L (ref 22–29)
CREAT SERPL-MCNC: 3.3 MG/DL (ref 0.5–0.9)
DEPRECATED RDW RBC AUTO: 53.8 FL (ref 35–45)
ERYTHROCYTE [DISTWIDTH] IN BLOOD BY AUTOMATED COUNT: 15.9 % (ref 11.5–14.5)
GFR SERPL CREATININE-BSD FRML MDRD: 14 ML/MIN/1.73M2
GLUCOSE SERPL-MCNC: 84 MG/DL (ref 74–109)
HCT VFR BLD AUTO: 32 % (ref 37–47)
HGB BLD-MCNC: 10.2 GM/DL (ref 12–16)
MCH RBC QN AUTO: 29.3 PG (ref 26–33)
MCHC RBC AUTO-ENTMCNC: 31.9 GM/DL (ref 32.2–35.5)
MCV RBC AUTO: 92 FL (ref 81–99)
PHOSPHATE SERPL-MCNC: 3.3 MG/DL (ref 2.5–4.5)
PLATELET # BLD AUTO: 160 THOU/MM3 (ref 130–400)
PMV BLD AUTO: 9.9 FL (ref 9.4–12.4)
POTASSIUM SERPL-SCNC: 3.6 MEQ/L (ref 3.5–5.2)
PTH-INTACT SERPL-MCNC: 35 PG/ML (ref 15–65)
RBC # BLD AUTO: 3.48 MILL/MM3 (ref 4.2–5.4)
SODIUM SERPL-SCNC: 144 MEQ/L (ref 135–145)
WBC # BLD AUTO: 7.1 THOU/MM3 (ref 4.8–10.8)

## 2025-07-11 PROCEDURE — 83970 ASSAY OF PARATHORMONE: CPT

## 2025-07-11 PROCEDURE — 36415 COLL VENOUS BLD VENIPUNCTURE: CPT

## 2025-07-11 PROCEDURE — 99232 SBSQ HOSP IP/OBS MODERATE 35: CPT | Performed by: INTERNAL MEDICINE

## 2025-07-11 PROCEDURE — 99239 HOSP IP/OBS DSCHRG MGMT >30: CPT | Performed by: INTERNAL MEDICINE

## 2025-07-11 PROCEDURE — 85027 COMPLETE CBC AUTOMATED: CPT

## 2025-07-11 PROCEDURE — 82306 VITAMIN D 25 HYDROXY: CPT

## 2025-07-11 PROCEDURE — 80048 BASIC METABOLIC PNL TOTAL CA: CPT

## 2025-07-11 PROCEDURE — 84100 ASSAY OF PHOSPHORUS: CPT

## 2025-07-11 PROCEDURE — 6360000002 HC RX W HCPCS: Performed by: INTERNAL MEDICINE

## 2025-07-11 PROCEDURE — 6370000000 HC RX 637 (ALT 250 FOR IP): Performed by: INTERNAL MEDICINE

## 2025-07-11 PROCEDURE — 2580000003 HC RX 258: Performed by: INTERNAL MEDICINE

## 2025-07-11 RX ORDER — PREDNISONE 10 MG/1
10 TABLET ORAL DAILY
Qty: 30 TABLET | Refills: 0 | Status: SHIPPED | OUTPATIENT
Start: 2025-07-12 | End: 2025-08-11

## 2025-07-11 RX ORDER — ROSUVASTATIN CALCIUM 10 MG/1
10 TABLET, COATED ORAL DAILY
Qty: 30 TABLET | Refills: 3 | Status: SHIPPED | OUTPATIENT
Start: 2025-07-12

## 2025-07-11 RX ORDER — PREDNISONE 10 MG/1
10 TABLET ORAL DAILY
Status: DISCONTINUED | OUTPATIENT
Start: 2025-07-12 | End: 2025-07-11 | Stop reason: HOSPADM

## 2025-07-11 RX ADMIN — PREDNISONE 20 MG: 20 TABLET ORAL at 10:23

## 2025-07-11 RX ADMIN — HEPARIN SODIUM 5000 UNITS: 5000 INJECTION INTRAVENOUS; SUBCUTANEOUS at 05:46

## 2025-07-11 RX ADMIN — ROSUVASTATIN CALCIUM 10 MG: 10 TABLET, FILM COATED ORAL at 10:23

## 2025-07-11 RX ADMIN — CALCIUM 500 MG: 500 TABLET ORAL at 10:23

## 2025-07-11 RX ADMIN — SODIUM CHLORIDE: 0.9 INJECTION, SOLUTION INTRAVENOUS at 01:25

## 2025-07-11 RX ADMIN — ASPIRIN 81 MG: 81 TABLET, COATED ORAL at 10:23

## 2025-07-11 RX ADMIN — AMLODIPINE BESYLATE 5 MG: 5 TABLET ORAL at 10:23

## 2025-07-11 RX ADMIN — METOPROLOL TARTRATE 50 MG: 50 TABLET, FILM COATED ORAL at 10:23

## 2025-07-11 NOTE — DISCHARGE SUMMARY
[facility]--HOSPITALIST GROUP    Hospitalist DISCHARGE SUMMARY dictated by Rashida Barrett MD on 2025      DEMOGRAPHICS     Date of Service: 2025  12:07 PM   Patient ID:Ashwini Pulido is79 y.o.   : 1945 MRN:[id number]  Code Status:  Full Code  Admit date: 2025  Discharge date: 2025     Primary Care Physician - Carline Bowen MD   Patient Care Team:  Carline Bowen MD as PCP - General (Family Medicine)  Tel: [phone]  IP CONSULT TO NEPHROLOGY  Admitting Physician: Rashida Barrett MD   Discharge Physician: Rashida Barrett MD      MEDICAL SYNOPSIS     FOLLOW UP APPOINTMENTS:   Carline Bowen MD  [address]  [facility] [id number] [phone]    Follow up      Connor Matta MD  [address] [place] [address]  [address]  [place] [address]  [phone]    Schedule an appointment as soon as possible for a visit in 2 week(s)        Code Status:  Full Code    Diet: ADULT DIET; Regular; Low Sodium (2 gm); Low Potassium (Less than 3000 mg/day); Low Phosphorus (Less than 1000 mg); Less than 60 gm; 2000 ml    Final diagnoses:       Principal Problem:    CKD (chronic kidney disease) stage 4, GFR 15-29 ml/min ([facility])  Active Problems:    Hypertension    Hypercholesteremia    Interstitial nephritis    LEVAR (acute kidney injury)    Metabolic acidosis    Drug-induced interstitial nephritis  Resolved Problems:    * No resolved hospital problems. *  LEVAR on CKD stage IV   Drug-induced interstitial nephritis on oral prednisone. History of NSAID use.  Monoclonal gammopathy/MGUS followed by hematology.    No evidence of monoclonal gammopathy on kidney biopsy  Increased renal echogenicity consistent with chronic medical renal disease per renal ultrasound 2025. Numerous renal cysts incidentally noted.  Normal TSH from 2025.      Ashwini Pulido is a 79 y.o.  female who presents with Abnormal Lab      Hospital Course:     Per report:  79-year-old female

## 2025-07-11 NOTE — PROGRESS NOTES
[facility]   Nephrology progress note  7/11/2025      Pt Name:    Ashwini Pulido  MRN:     [id number]     YOB: 1945  Admit Date:    7/8/2025  3:42 PM    Chief Complaint: Nephrology following for LEVAR/CKD    Subjective:  Patient was seen and examined this morning  No chest pain or shortness of breath  Feels better  BMP pending    Objective:  24HR INTAKE/OUTPUT:    Intake/Output Summary (Last 24 hours) at 7/11/2025 1125  Last data filed at 7/11/2025 0558  Gross per 24 hour   Intake 4697.4 ml   Output --   Net 4697.4 ml         I/O last 3 completed shifts:  In: 4937.4 [P.O.:840; I.V.:4097.4]  Out: -   No intake/output data recorded.   Admission weight: 59 kg (130 lb)  Wt Readings from Last 3 Encounters:   07/08/25 59 kg (130 lb)   05/19/25 59.4 kg (131 lb)   04/29/25 59.7 kg (131 lb 9.6 oz)        Vitals :   Vitals:    07/10/25 1531 07/10/25 2124 07/11/25 0334 07/11/25 1016   BP: (!) 111/58 (!) 130/58 (!) 143/67 (!) 122/46   Pulse: 77 67 73 74   Resp: 18 20 19 16   Temp: 98.5 °F (36.9 °C) 98.1 °F (36.7 °C) 97.7 °F (36.5 °C) 98.2 °F (36.8 °C)   TempSrc: Oral Oral Oral Oral   SpO2: 90% 93% 90% 92%   Weight:       Height:           Physical examination  General Appearance: alert and cooperative with exam, appears comfortable, no distress  Mouth/Throat: Oral mucosa moist  Neck: No JVD  Lungs: Air entry B/L, no rales, no use of accessory muscles  Extremities: No significant LE edema    Medications:  Infusion:    sodium chloride 75 mL/hr at 07/11/25 0558     Meds:    aspirin  81 mg Oral Daily    calcium elemental  500 mg Oral BID    [Held by provider] losartan  25 mg Oral Daily    metoprolol tartrate  50 mg Oral BID    predniSONE  20 mg Oral Daily    amLODIPine  5 mg Oral BID    heparin (porcine)  5,000 Units SubCUTAneous 3 times per day    rosuvastatin  10 mg Oral Daily     Meds prn:      Lab Data :  CBC:   Recent Labs     07/08/25  1600 07/09/25  0539 07/11/25  0554   WBC 8.1 7.3 7.1

## 2025-07-11 NOTE — CARE COORDINATION
7/11/25, 12:55 PM EDT    Patient goals/plan/ treatment preferences discussed by  and .  Patient goals/plan/ treatment preferences reviewed with patient/ family.  Patient/ family verbalize understanding of discharge plan and are in agreement with goal/plan/treatment preferences.  Understanding was demonstrated using the teach back method.  AVS provided by RN at time of discharge, which includes all necessary medical information pertaining to the patients current course of illness, treatment, post-discharge goals of care, and treatment preferences.     Services At/After Discharge: None

## 2025-07-11 NOTE — PROGRESS NOTES
Results   Component Value Date    TSH 3.47 07/09/2025     D-Dimer: No results found for: \"DDIMER\"    Lactic acid: No components found for: \"LACT\"     Troponin T No results for input(s): \"TROPHS\" in the last 72 hours.    Patient Active Problem List   Diagnosis    Weakness    Occlusion of left carotid artery    Left bundle branch block    Hypertension    Hypercholesteremia    CKD (chronic kidney disease) stage 4, GFR 15-29 ml/min ([facility])    CKD (chronic kidney disease)    Interstitial nephritis    LEVAR (acute kidney injury)    Metabolic acidosis    Drug-induced interstitial nephritis       PAST MEDICAL HISTORY    has a past medical history of Chronic kidney disease, Hypertension, and Kyphosis.    SURGICAL HISTORY      has a past surgical history that includes Cholecystectomy (N/A, 11/2022); CT BIOPSY RENAL (3/7/2025); and CT BIOPSY BONE MARROW (4/15/2025).    CURRENT MEDICATIONS     aspirin, 81 mg, Daily  calcium elemental, 500 mg, BID  [Held by provider] losartan, 25 mg, Daily  metoprolol tartrate, 50 mg, BID  predniSONE, 20 mg, Daily  amLODIPine, 5 mg, BID  heparin (porcine), 5,000 Units, 3 times per day  rosuvastatin, 10 mg, Daily        Continuous Infusions:    sodium chloride 75 mL/hr at 07/11/25 0558       PRN:         HOME MEDICATIONS     Medications Prior to Admission: predniSONE (DELTASONE) 20 MG tablet, Take 2 tablets by mouth daily (Patient taking differently: Take 1 tablet by mouth daily)  calcium carbonate (OSCAL) 500 MG TABS tablet, Take 1 tablet by mouth 2 times daily  amLODIPine (NORVASC) 5 MG tablet, Take 1 tablet by mouth 2 times daily  aspirin 81 MG EC tablet, Take 1 tablet by mouth daily  metoprolol tartrate (LOPRESSOR) 50 MG tablet, Take 1 tablet by mouth 2 times daily  rosuvastatin (CRESTOR) 40 MG tablet, Take 1 tablet by mouth daily  losartan (COZAAR) 25 MG tablet, Take 1 tablet by mouth daily (Patient not taking: Reported on 7/8/2025)      ALLERGIES     has no known allergies.  No Known  Protein:   No results found for: \"TPU\"  Urine Creatinine:   No results found for: \"LABCREA\"  Urine Eosinophils:  No components found for: \"UEOS\"    Thyroid Studies:   No results found for: \"T4\"  No results found for: \"T3\"  Lab Results   Component Value Date/Time    TSH 3.47 07/09/2025 05:39 AM       Lab Results   Component Value Date    TSH 3.47 07/09/2025       HbA1c  No components found for: \"HA1CC\"    Lipids:  No results found for: \"CHOLESTEROL\", \"HDL\", \"LDL\", \"VLDL\"      CRP: No results found for: \"CRP\"  ESR: No results found for: \"SEDRATE\"    Folate and B12: No results found for: \"TFYBNWNP54\", No results found for: \"FOLATE\"    Blood Culture: No results for input(s): \"BC\", \"BLOODCULT2\", \"ORG\" in the last 72 hours.    GRAM STAIN  No results for input(s): \"LABGRAM\", \"LABANAE\", \"ORG\", \"WNDABS\" in the last 72 hours.    Resp Culture Brief : No results found for: \"CULTRESP\"    Body Fluid : No results found for: \"BFCX\"    MRSA : No results found for: \"MRSAC\"    Urine Culture Brief : No results found for: \"LABURIN\"     Organism Brief : No results found for: \"ORG\"    US RENAL COMPLETE  Result Date: 7/8/2025  US kidneys and bladder Comparison: None provided Findings: Right kidney 8.9 cm length. RI: 0.5. Left kidney 11.0 cm length. RI: 0.6. Bilateral increased renal echogenicity noted. This is consistent with chronic medical disease. Numerous bilateral renal cysts noted. No solid lesions are identified. No bilateral hydronephrosis. The urinary bladder is unremarkable. Prevoid volume 220 mL. Post void volume not performed. Bilateral ureteral jets visualized.     Impression: Increased renal echogenicity consistent with chronic medical renal disease Numerous renal cysts incidentally noted This document has been electronically signed by: Jaya Oliver MD on 07/08/2025 10:21 PM      This note was dictated using M*Modal Fluency Direct so please excuse any grammatical or syntax errors as no guarantees can be provided that

## 2025-07-11 NOTE — PROGRESS NOTES
Nutrition Education    Educated on CKD diet education. Specifically reviewed sodium and potassium containing foods. Discussed substitutions of foods she currently eats. She eats a lot of fried meat, potatoes, potato chips, etc. Provided handouts and RD office # if she has further questions/ needs. Patient agreeable to had no further questions.   Learners: Patient  Readiness: Eager  Method: Explanation and Handout  Response: Verbalizes Understanding  Contact name and number provided.    DEAN RUIZ RD  Contact Number: [phone]

## 2025-07-28 ENCOUNTER — TELEPHONE (OUTPATIENT)
Dept: NEPHROLOGY | Age: 80
End: 2025-07-28

## 2025-07-28 NOTE — TELEPHONE ENCOUNTER
Patient called in stating that from her knee down she is swelling pretty bad. Pt said her skin is peeling and it is starting to hurt.     Would you like to see patient sooner?

## 2025-07-30 ENCOUNTER — OFFICE VISIT (OUTPATIENT)
Dept: NEPHROLOGY | Age: 80
End: 2025-07-30
Payer: MEDICARE

## 2025-07-30 VITALS
HEIGHT: 66 IN | OXYGEN SATURATION: 96 % | BODY MASS INDEX: 21.38 KG/M2 | DIASTOLIC BLOOD PRESSURE: 72 MMHG | SYSTOLIC BLOOD PRESSURE: 151 MMHG | HEART RATE: 70 BPM | WEIGHT: 133 LBS

## 2025-07-30 DIAGNOSIS — N17.9 AKI (ACUTE KIDNEY INJURY): Primary | ICD-10-CM

## 2025-07-30 DIAGNOSIS — N18.4 CKD (CHRONIC KIDNEY DISEASE), STAGE IV (HCC): ICD-10-CM

## 2025-07-30 DIAGNOSIS — N12 IN (INTERSTITIAL NEPHRITIS): ICD-10-CM

## 2025-07-30 PROCEDURE — G8420 CALC BMI NORM PARAMETERS: HCPCS | Performed by: INTERNAL MEDICINE

## 2025-07-30 PROCEDURE — 1123F ACP DISCUSS/DSCN MKR DOCD: CPT | Performed by: INTERNAL MEDICINE

## 2025-07-30 PROCEDURE — 1111F DSCHRG MED/CURRENT MED MERGE: CPT | Performed by: INTERNAL MEDICINE

## 2025-07-30 PROCEDURE — 3078F DIAST BP <80 MM HG: CPT | Performed by: INTERNAL MEDICINE

## 2025-07-30 PROCEDURE — 99214 OFFICE O/P EST MOD 30 MIN: CPT | Performed by: INTERNAL MEDICINE

## 2025-07-30 PROCEDURE — 1090F PRES/ABSN URINE INCON ASSESS: CPT | Performed by: INTERNAL MEDICINE

## 2025-07-30 PROCEDURE — 1159F MED LIST DOCD IN RCRD: CPT | Performed by: INTERNAL MEDICINE

## 2025-07-30 PROCEDURE — G8427 DOCREV CUR MEDS BY ELIG CLIN: HCPCS | Performed by: INTERNAL MEDICINE

## 2025-07-30 PROCEDURE — 1036F TOBACCO NON-USER: CPT | Performed by: INTERNAL MEDICINE

## 2025-07-30 PROCEDURE — G8400 PT W/DXA NO RESULTS DOC: HCPCS | Performed by: INTERNAL MEDICINE

## 2025-07-30 PROCEDURE — 3077F SYST BP >= 140 MM HG: CPT | Performed by: INTERNAL MEDICINE

## 2025-07-30 RX ORDER — CALCIUM CARBONATE 500(1250)
1 TABLET ORAL 2 TIMES DAILY
COMMUNITY
Start: 2025-07-09 | End: 2025-07-30

## 2025-07-30 RX ORDER — BUMETANIDE 1 MG/1
1 TABLET ORAL DAILY
Qty: 30 TABLET | Refills: 1 | Status: SHIPPED | OUTPATIENT
Start: 2025-07-30

## 2025-07-30 RX ORDER — LOSARTAN POTASSIUM 25 MG/1
25 TABLET ORAL DAILY
COMMUNITY
Start: 2025-07-12 | End: 2025-07-30

## 2025-07-30 RX ORDER — PREDNISONE 1 MG/1
5 TABLET ORAL DAILY
Qty: 150 TABLET | Refills: 1 | Status: SHIPPED | OUTPATIENT
Start: 2025-07-30

## 2025-07-30 RX ORDER — AMLODIPINE BESYLATE 5 MG/1
5 TABLET ORAL DAILY
Qty: 30 TABLET | Refills: 1
Start: 2025-07-30

## 2025-07-30 NOTE — PROGRESS NOTES
[facility] [place] [facility]  [facility]  [address]  [facility] [address]  Dept: [phone]  Loc: [phone]  Office Progress Note  7/30/2025 3:52 PM      Pt Name:    Ashwini Pulido  YOB: 1945  Primary Care Physician:  Carline Bowen MD     Chief Complaint:   Chief Complaint   Patient presents with    Follow-up     Per Dr. Matta--to discuss interstitial nephritis, CKD, further plans/management        Background Information/Interval History:   The patient is a 79 y.o. with history of CKD IV, hypertension who is here for follow-up evaluation of elevated creatinine.  Past medical history also includes dyslipidemia and chronic back pain.  Has CKD. Old labs reviewed. Creat in the past been around 1.7 to 1.8.  Ex-smoking 1ppd x 40+ years, quit about 10 years ago. Has taken mobic in the past. Hard of hearing.     March 2025: Pt is here for follow-up.  She is seeing hematology. Bp is stable had kidney biopsy recently. No urinary complaints. No hematuria. Pt is here with grand-daughter. Pt reports she was recently taking some aleve or advil and I've strongly advised her to avoid these. Previously she was taking mobic which she is no longer doing so. Grand-daughter reports pt does not drink much water and has some affinity for salt intake. No urinary infection symptoms. Morales snot drink much water.     May 2025: Pt saw me in March 2025 but repeat labs were not sent to me. Recently received a note from hematologist. We then decided to bring her in to go over the plan. She was started on prednisone 40 mg daily. She is tolerating it well. She is very hard of hearing. Feels okay. No new complaints. Bp is in 130s. Seen by hematology recently. Kidney biopsy showed interstitial nephritis ?NSAId use. Started on prednisone. Discussed in detail with patient and  today.     July 2025: Here for follow-up. Reports some leg swelling and bruises- pt

## 2025-08-05 ENCOUNTER — TELEPHONE (OUTPATIENT)
Dept: NEPHROLOGY | Age: 80
End: 2025-08-05

## 2025-08-05 DIAGNOSIS — N18.4 CKD (CHRONIC KIDNEY DISEASE), STAGE IV (HCC): Primary | ICD-10-CM

## 2025-08-05 DIAGNOSIS — I12.9 HYPERTENSIVE RENAL DISEASE, STAGE 1 THROUGH STAGE 4 OR UNSPECIFIED CHRONIC KIDNEY DISEASE: ICD-10-CM
